# Patient Record
Sex: FEMALE | Race: WHITE | NOT HISPANIC OR LATINO | Employment: FULL TIME | URBAN - METROPOLITAN AREA
[De-identification: names, ages, dates, MRNs, and addresses within clinical notes are randomized per-mention and may not be internally consistent; named-entity substitution may affect disease eponyms.]

---

## 2017-04-20 ENCOUNTER — ALLSCRIPTS OFFICE VISIT (OUTPATIENT)
Dept: OTHER | Facility: OTHER | Age: 32
End: 2017-04-20

## 2017-04-25 LAB — PAP (HISTORICAL): NORMAL

## 2018-01-10 NOTE — PROCEDURES
Assessment    1  Encounter for IUD removal (V25 12) (Z30 432)    Plan  Irregular menstrual cycle    · Norethin Ace-Eth Estrad-FE 1-20 MG-MCG Oral Tablet (Loestrin Fe 1/20); take 1  tablet every day   Rx By: Rajiv Villegas; Dispense: 28 Days ; #:28 Tablet; Refill: 2; For: Irregular menstrual cycle; BERNARD = N; Verified Transmission to [facility]/PHARMACY# [id number]; Last Updated By: System, [employer]; 1/21/2016 2:22:55 PM    Discussion/Summary  Discussion Summary:   Reviewed birth control options including OCP, NuvaRing, patch, Depo, Nexplanon  Patient decided on OCP  Reviewed when to start  What to do if misses pill  Recommend using condoms for first month on pill, if misses more than 2 pills in pack and for STD prevention  Reviewed common side effects of pill including N/V, headache, breast pain, weight gain, bloating, mood swings  Reassured side effects diminished after the first month or two on pill  Reviewed clotting risk and signs and symptoms of pulmonary embolism, deep vein thrombosis, myocardial infarction, and stroke  RTO in 3 months for pill check  Active Problems    1  Asthma, mild (493 90) (J45 998)   2  Depression (311) (F32 9)   3  Encounter for gynecological examination without abnormal finding (V72 31) (Z01 419)   4  Irregular menstrual cycle (626 4) (N92 6)   5  Presence of (intrauterine) contraceptive device (V45 51) (Z97 5)    Current Meds    1  BuSpar 10 MG TABS; Therapy: (Recorded:[id number]) to Recorded    Procedure  Procedure: removal of Mirena IUD  Indications for the procedure include partial expulsion  Risks, benefits and alternatives were discussed with the patient  Consent was obtained prior to the procedure and is detailed in the patient's record  Procedure Note:   a speculum was placed in the vagina, the strings were grasped with forceps and the IUD was removed and End of IUD visible at os  The IUD was discarded  Post-Procedure:   the patient tolerated the procedure well  Complications: none  The patient was instructed to notify a provider for heavy vaginal bleeding and for pelvic pain  Follow-up in the office as needed  Attending Note  Collaborating Physician Note: Collaborating Note: I agree with the Advanced Practitioner note  I discussed the case with the Advanced Practitioner and reviewed the AP note      Future Appointments    Date/Time Provider Specialty Site   04/21/2016 02:00 PM Lan Fisher PAC Obstetrics/Gynecology [facility]     History of Present Illness  Free Text HPI: 32 yo seen for IUD removal was having pelvic and back pains in the last few months  Ultrasound was done to evaluate because Mirena string appeared very long  Showed Mirena very low in uterus  Patient desire removal  Would like to start OCP        Signatures   Electronically signed by : SUSAN Silver; Feb 1 2016  4:41PM EST                       (Author)    Electronically signed by : Kyrie Villalobos MD; Feb  3 2016  9:54AM EST                       (Author)

## 2018-01-14 VITALS
BODY MASS INDEX: 32.95 KG/M2 | WEIGHT: 205 LBS | HEIGHT: 66 IN | SYSTOLIC BLOOD PRESSURE: 120 MMHG | DIASTOLIC BLOOD PRESSURE: 72 MMHG

## 2018-01-16 NOTE — PROGRESS NOTES
Assessment    1  Encounter for gynecological examination without abnormal finding (V72 31) (Z01 419)    Plan   Irregular menstrual cycle, SocHx: Presence of (intrauterine) contraceptive device    · * US PELVIS COMPLETE (TRANSABDOMINAL AND TRANSVAGINAL); Status:Hold For -  Scheduling; Requested BHR:[id number];    Perform:[facility]; Order Comments:Confirm IUD placement; RWY:[id number]; For:Irregular menstrual cycle, SocHx: Presence of (intrauterine) contraceptive device; Ordered By:Ericka Fisher;    (1) THIN PREP PAP WITH IMAGING; Status:Active; Requested for:15Jan2016;   Order Comments:Source: cervical   Patient has Mirena IUD; TOS:[id number];    Mik Si for gynecological examination without abnormal finding; Ordered By:Ericka Fisher; Discussion/Summary  health maintenance visit healthy adult female Currently, she eats a healthy diet  the risks and benefits of cervical cancer screening were discussed Pap test with reflex HPV testing was done today Breast cancer screening: breast cancer screening is not indicated  Colorectal cancer screening: colorectal cancer screening is not indicated  Osteoporosis screening: bone mineral density testing is not indicated  Advice and education were given regarding nutrition, calcium supplements and vitamin D supplements  Patient discussion: discussed with the patient  Recommend acidophilus daily  Reassured bleeding can be irregular with Mirena  Will get ultrasound to evaluate placement  History of Present Illness  HPI: 32 yo seen for annual exam  Has Mirena IUD  Menses coming Q 4 weeks, very light, brown spotting  Has been having some discomfort in lower pelvis and back over the past few months, comes and goes  IUD inserted: 5/2013  Denies bowel or bladder issues  Last pap: 3/2013 NILM  GYN HM, Adult Female [facility]: The patient is being seen for a health maintenance and gynecology evaluation     Social History: She is unmarried  Work status: occupation: [employer]  The patient is a current cigarette smoker and 1 pack a day  She reports occasional alcohol use and drinking 2 drinks per week  She has never used illicit drugs  General Health: The patient's health since the last visit is described as good  Lifestyle:  She uses tobacco  She consumes alcohol  She denies drug use  Reproductive health: the patient is premenopausal   she reports no menstrual problems  Menstrual history:  age at menarche was 17  LMP: the last menstrual period was 1/4/2016  Recent menstrual periods: bleeding has been normal  The cycles have been regular and occur approximately every 28 days  The duration of her recent periods has been regular and usually last 5 days  she is sexually active  She is monogamous with a male partner and declines STD testing, denies abuse  Screening:      Review of Systems  pelvic pain, but no pelvic pressure, no vaginal pain, no vaginal discharge, no vaginal itching, no vaginal lump or mass, no vaginal odor, no nonmenstrual bleeding, no vulvar pain, no vulvar itching, no vulvar lump or mass, no labial swelling, no dysmenorrhea, denied amenorrhea, no menorrhagia, no hypomenorrhea, no oligomenorrhea, no decreased time between periods, periods are regular, regular length of periods, no dysuria, no bladder pain, no hematuria, no burning sensation during urination, no change in urinary frequency, no feelings of urinary urgency, no flank pain, no abnormal urethral discharge, urine is not foul-smelling, no urinary hesitancy and no urinary loss of control  Constitutional: No fever, no chills, feels well, no tiredness, no recent weight gain or loss  ENT: no ear ache, no loss of hearing, no nosebleeds or nasal discharge, no sore throat or hoarseness  Cardiovascular: no complaints of slow or fast heart rate, no chest pain, no palpitations, no leg claudication or lower extremity edema     Respiratory: no complaints of shortness of breath, no wheezing, no dyspnea on exertion, no orthopnea or PND  Breasts: no complaints of breast pain, breast lump or nipple discharge  Gastrointestinal: no complaints of abdominal pain, no constipation, no nausea or diarrhea, no vomiting, no bloody stools  Genitourinary: no complaints of dysuria, no incontinence, no pelvic pain, no dysmenorrhea, no vaginal discharge or abnormal vaginal bleeding  Musculoskeletal: no complaints of arthralgia, no myalgia, no joint swelling or stiffness, no limb pain or swelling  Integumentary: no complaints of skin rash or lesion, no itching or dry skin, no skin wounds  Neurological: no complaints of headache, no confusion, no numbness or tingling, no dizziness or fainting  OB History  Pregnancy History (Brief):   Prior pregnancies: : 2  Para: 2 (full-term) and 2 (living)   Delivery type: 2 vaginal    x 2:  M,  M      Family History    · No pertinent family history    · Family history of cardiac disorder (V17 49) (Z82 49)   · Family history of diabetes mellitus (V18 0) (Z83 3)   · Family history of hypertension (V17 49) (Z82 49)    Social History    · Current every day smoker (305 1) (F17 200)   · No drug use   · Presence of (intrauterine) contraceptive device (V45 51) (Z97 5)   · Social alcohol use (F10 99)    Current Meds   1  BuSpar 10 MG TABS; Therapy: (Recorded:2016) to Recorded   2  Norethin Ace-Eth Estrad-FE 1-20 MG-MCG Oral Tablet; take 1 tablet every day; Therapy: 2016 to (Evaluate:2016)  Requested for: 2016; Last   Rx:2016 Ordered    Allergies    1  nystatin    Vitals   Recorded: [id number] 72:18VT   Systolic 790, RUE, Sitting   Diastolic 83, RUE, Sitting   Height 5 ft 5 in   Weight 180 lb    BMI Calculated 29 95   BSA Calculated 1 89     Physical Exam    Constitutional   General appearance: No acute distress, well appearing and well nourished      Neck   Neck: Normal, supple, trachea midline, no masses  Thyroid: Normal, no thyromegaly  Pulmonary   Respiratory effort: No increased work of breathing or signs of respiratory distress  Auscultation of lungs: Clear to auscultation  Cardiovascular   Auscultation of heart: Normal rate and rhythm, normal S1 and S2, no murmurs  Peripheral vascular exam: Normal pulses Throughout  Genitourinary   External genitalia: Normal and no lesions appreciated  Vagina: Normal, no lesions or dryness appreciated  Urethra: Normal     Urethral meatus: Normal     Bladder: Normal, soft, non-tender and no prolapse or masses appreciated  Cervix: Normal, no palpable masses  IUD strings visible at cervix but very long, about 2 inches  Uterus: Normal, non-tender, not enlarged, and no palpable masses  Adnexa/parametria: Normal, non-tender and no fullness or masses appreciated  Chest   Breasts: Normal and no dimpling or skin changes noted  Abdomen   Abdomen: Normal, non-tender, and no organomegaly noted  Liver and spleen: No hepatomegaly or splenomegaly  Examination for hernias: No hernias appreciated  Lymphatic   Palpation of lymph nodes in neck, axillae, groin and/or other locations: No lymphadenopathy or masses noted  Skin   Skin and subcutaneous tissue: Normal skin turgor and no rashes      Palpation of skin and subcutaneous tissue: Normal     Psychiatric   Orientation to person, place, and time: Normal     Mood and affect: Normal        Future Appointments    Date/Time Provider Specialty Site   04/21/2016 02:00 PM SUSAN Crenshaw Obstetrics/Gynecology [id number] [facility]     Signatures   Electronically signed by : Any Campbell, [facility]; Feb 1 2016  4:38PM EST                       (Author)    Electronically signed by : DANUTA Jones ; Feb 1 2016  6:25PM EST

## 2018-03-05 DIAGNOSIS — Z30.41 SURVEILLANCE OF CONTRACEPTIVE PILL: Primary | ICD-10-CM

## 2018-03-05 RX ORDER — NORETHINDRONE ACETATE AND ETHINYL ESTRADIOL 1; 20 MG/1; UG/1
TABLET ORAL
Qty: 21 TABLET | Refills: 2 | Status: SHIPPED | OUTPATIENT
Start: 2018-03-05 | End: 2018-03-28 | Stop reason: SDUPTHER

## 2018-03-28 DIAGNOSIS — Z30.41 SURVEILLANCE OF CONTRACEPTIVE PILL: ICD-10-CM

## 2018-03-28 RX ORDER — NORETHINDRONE ACETATE AND ETHINYL ESTRADIOL 1; .02 MG/1; MG/1
1 TABLET ORAL DAILY
Qty: 90 TABLET | Refills: 0 | Status: SHIPPED | OUTPATIENT
Start: 2018-03-28 | End: 2018-04-24 | Stop reason: SDUPTHER

## 2018-04-24 ENCOUNTER — ANNUAL EXAM (OUTPATIENT)
Dept: OBGYN CLINIC | Facility: CLINIC | Age: 33
End: 2018-04-24
Payer: COMMERCIAL

## 2018-04-24 VITALS
WEIGHT: 214 LBS | BODY MASS INDEX: 35.65 KG/M2 | SYSTOLIC BLOOD PRESSURE: 112 MMHG | HEIGHT: 65 IN | DIASTOLIC BLOOD PRESSURE: 76 MMHG

## 2018-04-24 DIAGNOSIS — Z30.41 SURVEILLANCE OF CONTRACEPTIVE PILL: ICD-10-CM

## 2018-04-24 DIAGNOSIS — Z01.419 VISIT FOR GYNECOLOGIC EXAMINATION: Primary | ICD-10-CM

## 2018-04-24 PROCEDURE — S0612 ANNUAL GYNECOLOGICAL EXAMINA: HCPCS | Performed by: PHYSICIAN ASSISTANT

## 2018-04-24 RX ORDER — NORETHINDRONE ACETATE AND ETHINYL ESTRADIOL 1; .02 MG/1; MG/1
1 TABLET ORAL DAILY
Qty: 90 TABLET | Refills: 3 | Status: SHIPPED | OUTPATIENT
Start: 2018-04-24 | End: 2019-05-13 | Stop reason: SDUPTHER

## 2018-04-24 RX ORDER — VENLAFAXINE 100 MG/1
TABLET ORAL
COMMUNITY
Start: 2018-02-05 | End: 2018-04-24 | Stop reason: ALTCHOICE

## 2018-04-24 RX ORDER — BUSPIRONE HYDROCHLORIDE 10 MG/1
TABLET ORAL
COMMUNITY
Start: 2018-02-05 | End: 2022-01-26

## 2018-04-24 RX ORDER — ERGOCALCIFEROL 1.25 MG/1
CAPSULE ORAL
COMMUNITY
Start: 2018-02-05 | End: 2020-08-14

## 2018-04-24 NOTE — PROGRESS NOTES
Assessment/Plan   Problem List Items Addressed This Visit     Visit for gynecologic examination - Primary     Pap guidelines reviewed  Pap with HPV done today  Reviewed increased PMS and anxiety, seems to be correlated to more stress  Recommend continue to monitor now that she is no longer in stressful relationship  Offered to change pill, patient would like to continue Junel 1/20 and will call if symptoms persist or worsen  Return to office for annual or as needed  Relevant Orders    GP Liquid-Based Pap + HPV Plus      Other Visit Diagnoses     Surveillance of contraceptive pill        Relevant Medications    norethindrone-ethinyl estradiol (JUNEL 1/20) 1-20 MG-MCG per tablet          Subjective:     Patient ID: Camilla Hernandes is a 29 y o  y o  female  HPI  30 yo seen for annual exam  Patient is currently on Junel 1/20, tolerating well would like to continue  Denies bowel or bladder issues  Patient reports had been under a lot of stress with previous partner, just broke up a week ago  Reports noticed more PMS symptoms as well as hot flashes and anxiety worse prior to menses  Patient takes Buspar for anxiety  Last pap: 4/20/2017 NILM  The following portions of the patient's history were reviewed and updated as appropriate:   She  has a past medical history of Anxiety  She   Patient Active Problem List    Diagnosis Date Noted    Visit for gynecologic examination 04/24/2018    Low grade squamous intraepithelial lesion (LGSIL) on cervical Pap smear 05/25/2016    Migraine without aura and without status migrainosus, not intractable 02/01/2016    Asthma, mild 01/15/2016    Depression 01/15/2016    Irregular menstrual cycle 01/15/2016     She  has a past surgical history that includes [place] tooth extraction  Her family history includes Diabetes in her maternal grandmother; Hypertension in her maternal grandmother; No Known Problems in her father and mother;  Other in her maternal grandmother  She  reports that she quit smoking about 17 months ago  Her smoking use included Cigarettes  She has a 15 00 pack-year smoking history  She has never used smokeless tobacco  She reports that she drinks alcohol  She reports that she does not use drugs  Current Outpatient Prescriptions   Medication Sig Dispense Refill    busPIRone (BUSPAR) 10 mg tablet       ergocalciferol (VITAMIN D2) 50,000 units       norethindrone-ethinyl estradiol (JUNEL ) 1-20 MG-MCG per tablet Take 1 tablet by mouth daily 90 tablet 3     No current facility-administered medications for this visit  She is allergic to nystatin       Menstrual History:  OB History      Para Term  AB Living    2 2 2     2    SAB TAB Ectopic Multiple Live Births            2         Menarche age: 10  No LMP recorded  Period Cycle (Days): 25  Period Duration (Days): 2-5  Period Pattern: Regular  Dysmenorrhea: None    Review of Systems   Constitutional: Negative for fatigue, fever and unexpected weight change  HENT: Negative for dental problem and sinus pressure  Eyes: Negative for visual disturbance  Respiratory: Negative for cough, shortness of breath and wheezing  Cardiovascular: Negative for chest pain  Gastrointestinal: Negative for abdominal pain, blood in stool, constipation, diarrhea, nausea and vomiting  Endocrine: Negative for polydipsia  Genitourinary: Negative for difficulty urinating, dyspareunia, dysuria, frequency, hematuria, pelvic pain and urgency  Musculoskeletal: Negative for arthralgias and back pain  Neurological: Negative for dizziness, seizures, light-headedness and headaches  Psychiatric/Behavioral: Negative for suicidal ideas  The patient is not nervous/anxious  Objective:     Physical Exam   Constitutional: She is oriented to person, place, and time  She appears well-developed and well-nourished     Genitourinary: Vagina normal and uterus normal  There is no rash, tenderness, lesion, injury or Bartholin's cyst on the right labia  There is no rash, tenderness, lesion, injury or Bartholin's cyst on the left labia  Vagina exhibits no lesion  No erythema, tenderness or bleeding in the vagina  No signs of injury around the vagina  No vaginal discharge found  Right adnexum does not display mass, does not display tenderness and does not display fullness  Left adnexum does not display mass, does not display tenderness and does not display fullness  Cervix does not exhibit motion tenderness, lesion or discharge  Uterus is not enlarged, tender, exhibiting a mass, irregular (is regular) or mobile  HENT:   Head: Normocephalic and atraumatic  Neck: No thyromegaly present  Cardiovascular: Normal rate, regular rhythm and normal heart sounds  Exam reveals no gallop and no friction rub  No murmur heard  Pulmonary/Chest: Effort normal and breath sounds normal  No respiratory distress  She has no wheezes  Right breast exhibits no inverted nipple, no mass, no nipple discharge, no skin change and no tenderness  Left breast exhibits no inverted nipple, no mass, no nipple discharge, no skin change and no tenderness  Breasts are symmetrical  There is no breast swelling  Abdominal: Soft  She exhibits no distension and no mass  There is no tenderness  There is no rebound and no guarding  No hernia  Lymphadenopathy:     She has no cervical adenopathy  Right: No inguinal adenopathy present  Left: No inguinal adenopathy present  Neurological: She is alert and oriented to person, place, and time  Skin: Skin is warm and dry  Psychiatric: She has a normal mood and affect   Her behavior is normal

## 2018-04-24 NOTE — ASSESSMENT & PLAN NOTE
Pap guidelines reviewed  Pap with HPV done today  Reviewed increased PMS and anxiety, seems to be correlated to more stress  Recommend continue to monitor now that she is no longer in stressful relationship  Offered to change pill, patient would like to continue Junel 1/20 and will call if symptoms persist or worsen  Return to office for annual or as needed

## 2018-04-27 LAB
HPV HR 12 DNA CVX QL NAA+PROBE: NOT DETECTED
HPV16 DNA SPEC QL NAA+PROBE: NOT DETECTED
HPV18 DNA SPEC QL NAA+PROBE: NOT DETECTED
THIN PREP CVX: NORMAL

## 2019-05-13 ENCOUNTER — ANNUAL EXAM (OUTPATIENT)
Dept: OBGYN CLINIC | Facility: CLINIC | Age: 34
End: 2019-05-13
Payer: COMMERCIAL

## 2019-05-13 VITALS
BODY MASS INDEX: 33.27 KG/M2 | SYSTOLIC BLOOD PRESSURE: 116 MMHG | DIASTOLIC BLOOD PRESSURE: 70 MMHG | WEIGHT: 207 LBS | HEIGHT: 66 IN

## 2019-05-13 DIAGNOSIS — Z30.41 SURVEILLANCE OF CONTRACEPTIVE PILL: ICD-10-CM

## 2019-05-13 DIAGNOSIS — Z11.3 SCREENING FOR STD (SEXUALLY TRANSMITTED DISEASE): ICD-10-CM

## 2019-05-13 DIAGNOSIS — Z01.419 ENCOUNTER FOR GYNECOLOGICAL EXAMINATION (GENERAL) (ROUTINE) WITHOUT ABNORMAL FINDINGS: Primary | ICD-10-CM

## 2019-05-13 PROCEDURE — S0612 ANNUAL GYNECOLOGICAL EXAMINA: HCPCS | Performed by: PHYSICIAN ASSISTANT

## 2019-05-13 RX ORDER — NORETHINDRONE ACETATE AND ETHINYL ESTRADIOL 1; .02 MG/1; MG/1
1 TABLET ORAL DAILY
Qty: 90 TABLET | Refills: 3 | Status: SHIPPED | OUTPATIENT
Start: 2019-05-13 | End: 2020-05-04 | Stop reason: SDUPTHER

## 2019-05-16 LAB
DEPRECATED C TRACH RRNA XXX QL PRB: NOT DETECTED
N GONORRHOEA DNA UR QL NAA+PROBE: NOT DETECTED
T VAGINALIS RRNA SPEC QL NAA+PROBE: NOT DETECTED

## 2020-04-23 DIAGNOSIS — Z30.41 SURVEILLANCE OF CONTRACEPTIVE PILL: ICD-10-CM

## 2020-04-23 RX ORDER — NORETHINDRONE ACETATE AND ETHINYL ESTRADIOL 1; 20 MG/1; UG/1
TABLET ORAL
Qty: 84 TABLET | Refills: 3 | OUTPATIENT
Start: 2020-04-23

## 2020-05-04 RX ORDER — NORETHINDRONE ACETATE AND ETHINYL ESTRADIOL 1; .02 MG/1; MG/1
1 TABLET ORAL DAILY
Qty: 90 TABLET | Refills: 1 | Status: SHIPPED | OUTPATIENT
Start: 2020-05-04 | End: 2020-10-12

## 2020-08-02 ENCOUNTER — OFFICE VISIT (OUTPATIENT)
Dept: URGENT CARE | Facility: CLINIC | Age: 35
End: 2020-08-02
Payer: COMMERCIAL

## 2020-08-02 VITALS — TEMPERATURE: 99 F | OXYGEN SATURATION: 97 % | RESPIRATION RATE: 15 BRPM | HEART RATE: 91 BPM

## 2020-08-02 DIAGNOSIS — Z11.59 SPECIAL SCREENING EXAMINATION FOR UNSPECIFIED VIRAL DISEASE: Primary | ICD-10-CM

## 2020-08-02 PROCEDURE — G0382 LEV 3 HOSP TYPE B ED VISIT: HCPCS | Performed by: FAMILY MEDICINE

## 2020-08-02 PROCEDURE — S9083 URGENT CARE CENTER GLOBAL: HCPCS | Performed by: FAMILY MEDICINE

## 2020-08-02 PROCEDURE — U0003 INFECTIOUS AGENT DETECTION BY NUCLEIC ACID (DNA OR RNA); SEVERE ACUTE RESPIRATORY SYNDROME CORONAVIRUS 2 (SARS-COV-2) (CORONAVIRUS DISEASE [COVID-19]), AMPLIFIED PROBE TECHNIQUE, MAKING USE OF HIGH THROUGHPUT TECHNOLOGIES AS DESCRIBED BY CMS-2020-01-R: HCPCS | Performed by: FAMILY MEDICINE

## 2020-08-02 NOTE — PROGRESS NOTES
[address] [facility]        NAME: Josi Fabian is a 30 y o  female  : 1985    MRN: [id number]  DATE: 2020  TIME: 4:04 PM    Assessment and Plan   Special screening examination for unspecified viral disease [Z11 59]  1  Special screening examination for unspecified viral disease  Novel Coronavirus (COVID-19), PCR [facility] - Office Collection     Curbside evaluation and COVID-19 swab performed  Advised to self-quarantine into results are received  Patient Instructions     Follow up with PCP in 3-5 days  Proceed to  ER if symptoms worsen  Chief Complaint     Chief Complaint   Patient presents with    Fever     Sore throat and fever this morning  Tmax 100 1  History of Present Illness       53-year-old female presents today with mild temperature elevation between 99 and 100°  Has 2 sons with URI symptoms  Review of Systems   Review of Systems   Constitutional: Positive for fever  Negative for chills  HENT: Negative for congestion and rhinorrhea  Respiratory: Negative for cough and shortness of breath  Cardiovascular: Negative for chest pain  Gastrointestinal: Negative for abdominal pain and nausea  Skin: Negative for rash  Neurological: Negative for dizziness and headaches           Current Medications       Current Outpatient Medications:     busPIRone (BUSPAR) 10 mg tablet, , Disp: , Rfl:     ergocalciferol (VITAMIN D2) 50,000 units, , Disp: , Rfl:     escitalopram (LEXAPRO) 10 mg tablet, Take 10 mg by mouth every morning, Disp: , Rfl:     norethindrone-ethinyl estradiol (Junel 1/20) 1-20 MG-MCG per tablet, Take 1 tablet by mouth daily, Disp: 90 tablet, Rfl: 1    Current Allergies     Allergies as of 2020 - Reviewed 2020   Allergen Reaction Noted    Nystatin Rash 01/15/2016            The following portions of the patient's history were reviewed and updated as appropriate: allergies, current medications, past family history, past medical history, past social history, past surgical history and problem list      Past Medical History:   Diagnosis Date    Abnormal Pap smear of cervix 2016    Colpo - benign ECC    Anxiety     Tonsillitis        Past Surgical History:   Procedure Laterality Date    CARPAL TUNNEL RELEASE      2/6/19 - right; 5/1/19 - left    WISDOM TOOTH EXTRACTION         Family History   Problem Relation Age of Onset    No Known Problems Mother     Other Maternal Grandmother         cardiac disorder    Diabetes Maternal Grandmother         mellitus    Hypertension Maternal Grandmother     No Known Problems Father          Medications have been verified  Objective   Pulse 91   Temp 99 °F (37 2 °C)   Resp 15   SpO2 97%        Physical Exam     Physical Exam   Constitutional:  Non-toxic appearance  She does not appear ill  No distress  HENT:   Head: Normocephalic and atraumatic  Eyes: Conjunctivae are normal  Right eye exhibits no discharge  Left eye exhibits no discharge  Cardiovascular: Normal rate and regular rhythm  No murmur heard  Pulmonary/Chest: Effort normal and breath sounds normal  No stridor  No respiratory distress  She has no wheezes  She has no rhonchi  Abdominal: Normal appearance  Neurological: She is alert  Skin: Skin is warm  She is not diaphoretic  No erythema  Psychiatric: Her behavior is normal  Mood, judgment and thought content normal    Nursing note and vitals reviewed

## 2020-08-03 LAB — SARS-COV-2 RNA SPEC QL NAA+PROBE: NOT DETECTED

## 2020-08-05 ENCOUNTER — TELEPHONE (OUTPATIENT)
Dept: URGENT CARE | Facility: CLINIC | Age: 35
End: 2020-08-05

## 2020-08-14 ENCOUNTER — ANNUAL EXAM (OUTPATIENT)
Dept: OBGYN CLINIC | Facility: CLINIC | Age: 35
End: 2020-08-14
Payer: COMMERCIAL

## 2020-08-14 VITALS
DIASTOLIC BLOOD PRESSURE: 76 MMHG | BODY MASS INDEX: 35.65 KG/M2 | TEMPERATURE: 98 F | HEIGHT: 65 IN | SYSTOLIC BLOOD PRESSURE: 110 MMHG | WEIGHT: 214 LBS

## 2020-08-14 DIAGNOSIS — Z01.419 GYNECOLOGIC EXAM NORMAL: Primary | ICD-10-CM

## 2020-08-14 PROCEDURE — S0612 ANNUAL GYNECOLOGICAL EXAMINA: HCPCS | Performed by: PHYSICIAN ASSISTANT

## 2020-08-14 NOTE — ASSESSMENT & PLAN NOTE
Pap guidelines reviewed  Pap deferred secondary to negative pap and HPV in 2018  Will plan to continue Junel 1/20  Return to office for annual or as needed

## 2020-10-11 DIAGNOSIS — Z30.41 SURVEILLANCE OF CONTRACEPTIVE PILL: ICD-10-CM

## 2020-10-12 RX ORDER — NORETHINDRONE ACETATE AND ETHINYL ESTRADIOL 1; 20 MG/1; UG/1
TABLET ORAL
Qty: 84 TABLET | Refills: 3 | Status: SHIPPED | OUTPATIENT
Start: 2020-10-12 | End: 2022-01-26 | Stop reason: SDUPTHER

## 2020-12-07 NOTE — PROGRESS NOTES
Assessment/Plan   Problem List Items Addressed This Visit        Other    Gynecologic exam normal - Primary     Pap guidelines reviewed  Pap deferred secondary to negative pap and HPV in 2018  Will plan to continue Junel 1/20  Return to office for annual or as needed  Subjective:     Patient ID: Elis Farrell is a 30 y o  y o  female  HPI  29 yo seen for annual exam  Currently on Junel 1/20 tolerating well would like to continue  Denies bowel or bladder issues  Last pap: 4/24/2018 NILM (-)HRHPV  The following portions of the patient's history were reviewed and updated as appropriate:   She  has a past medical history of Abnormal Pap smear of cervix (2016), Anxiety, and Tonsillitis  She   Patient Active Problem List    Diagnosis Date Noted    Gynecologic exam normal 08/14/2020    Encounter for gynecological examination (general) (routine) without abnormal findings 05/13/2019    Migraine without aura and without status migrainosus, not intractable 02/01/2016    Asthma, mild 01/15/2016    Depression 01/15/2016    Irregular menstrual cycle 01/15/2016     She  has a past surgical history that includes [place] tooth extraction and Carpal tunnel release  Her family history includes Diabetes in her maternal grandmother; Hypertension in her maternal grandmother; No Known Problems in her father and mother; Other in her maternal grandmother  She  reports that she quit smoking about 3 years ago  Her smoking use included cigarettes  She has a 15 00 pack-year smoking history  She has never used smokeless tobacco  She reports current alcohol use of about 2 0 standard drinks of alcohol per week  She reports that she does not use drugs    Current Outpatient Medications   Medication Sig Dispense Refill    amoxicillin-clavulanate (AUGMENTIN) 875-125 mg per tablet Take 1 tablet by mouth every 12 (twelve) hours for 10 days 20 tablet 0    busPIRone (BUSPAR) 10 mg tablet       escitalopram (LEXAPRO) [facility]   [facility]    Please send a Heart Failure Assessment today 12/7/2020. Please report if patient has any:    -shortness of breath  -paroxysmal nocturnal dyspnea  -dyspnea on exertion  -cough  -dizziness or lightheadedness  -syncope  -edema, in lower extremities  -if abdomen is feeling full or patient has complaints of early satiety or bloating    Please also send:    -current medication list; please note if patient is refusing any medications  -most recent weights for the past 7 days  -blood pressure and heart rate for the past 7 days    Please fax to [phone].      After information is received any new orders or changes will be faxed back to your facility.    Thank you,    Mignon URENA  [facility]  Com[facility] Failure [facility]  [facility] [address]  P: [phone]  F: [phone]        10 mg tablet Take 10 mg by mouth every morning      norethindrone-ethinyl estradiol (Junel 1/20) 1-20 MG-MCG per tablet Take 1 tablet by mouth daily 90 tablet 1     No current facility-administered medications for this visit  She is allergic to nystatin       Menstrual History:  OB History        2    Para   2    Term   2            AB        Living   2       SAB        TAB        Ectopic        Multiple        Live Births   2                  No LMP recorded (approximate)  Patient is premenopausal          Review of Systems   Constitutional: Negative for fatigue, fever and unexpected weight change  HENT: Negative for dental problem and sinus pressure  Eyes: Negative for visual disturbance  Respiratory: Negative for cough, shortness of breath and wheezing  Cardiovascular: Negative for chest pain  Gastrointestinal: Negative for abdominal pain, blood in stool, constipation, diarrhea, nausea and vomiting  Endocrine: Negative for polydipsia  Genitourinary: Negative for difficulty urinating, dyspareunia, dysuria, frequency, hematuria, pelvic pain and urgency  Musculoskeletal: Negative for arthralgias and back pain  Neurological: Negative for dizziness, seizures, light-headedness and headaches  Psychiatric/Behavioral: Negative for suicidal ideas  The patient is not nervous/anxious  Objective:  Vitals:    20 1539   BP: 110/76   BP Location: Left arm   Patient Position: Sitting   Cuff Size: Large   Temp: 98 °F (36 7 °C)   Weight: 97 1 kg (214 lb)   Height: 5' 5" (1 651 m)      Physical Exam  Constitutional:       Appearance: Normal appearance  She is well-developed  Genitourinary:      Vulva, urethra, bladder, vagina and uterus normal       No vulval condylomata, lesion, tenderness, ulcerations, Bartholin's cyst or rash noted  No signs of labial injury  No labial fusion  No inguinal adenopathy present in the right or left side       No urethral prolapse, pain, swelling, tenderness, caruncle, mass or diverticulum present  Bladder is not distended or tender  No signs of injury or lesions in the vagina  No vaginal discharge, erythema, tenderness or bleeding  No cervical motion tenderness, discharge or lesion  Uterus is not enlarged, tender, irregular or mobile  No uterine mass detected  No right or left adnexal mass present  Right adnexa not tender or full  Left adnexa not tender or full  HENT:      Head: Normocephalic and atraumatic  Neck:      Thyroid: No thyromegaly  Cardiovascular:      Rate and Rhythm: Normal rate and regular rhythm  Heart sounds: Normal heart sounds  No murmur  No friction rub  No gallop  Pulmonary:      Effort: Pulmonary effort is normal  No respiratory distress  Breath sounds: Normal breath sounds  No wheezing  Chest:      Breasts: Breasts are symmetrical          Right: Normal  No swelling, bleeding, inverted nipple, mass, nipple discharge, skin change or tenderness  Left: Normal  No swelling, bleeding, inverted nipple, mass, nipple discharge, skin change or tenderness  Abdominal:      General: There is no distension  Palpations: Abdomen is soft  There is no mass  Tenderness: There is no abdominal tenderness  There is no guarding or rebound  Hernia: No hernia is present  Lymphadenopathy:      Cervical: No cervical adenopathy  Upper Body:      Right upper body: No supraclavicular, axillary or pectoral adenopathy  Left upper body: No supraclavicular, axillary or pectoral adenopathy  Lower Body: No right inguinal adenopathy  No left inguinal adenopathy  Neurological:      Mental Status: She is alert and oriented to person, place, and time  Skin:     General: Skin is warm and dry     Psychiatric:         Behavior: Behavior normal

## 2021-04-09 ENCOUNTER — OFFICE VISIT (OUTPATIENT)
Dept: FAMILY MEDICINE CLINIC | Facility: HOSPITAL | Age: 36
End: 2021-04-09

## 2021-12-06 ENCOUNTER — TELEPHONE (OUTPATIENT)
Dept: OBGYN CLINIC | Facility: CLINIC | Age: 36
End: 2021-12-06

## 2021-12-11 ENCOUNTER — HOSPITAL ENCOUNTER (OUTPATIENT)
Dept: ULTRASOUND IMAGING | Facility: HOSPITAL | Age: 36
Discharge: HOME | End: 2021-12-11
Payer: COMMERCIAL

## 2021-12-11 DIAGNOSIS — N92.6 IRREGULAR MENSTRUAL BLEEDING: ICD-10-CM

## 2021-12-11 PROCEDURE — 76856 US EXAM PELVIC COMPLETE: CPT

## 2021-12-11 PROCEDURE — 76830 TRANSVAGINAL US NON-OB: CPT

## 2021-12-16 ENCOUNTER — TELEPHONE (OUTPATIENT)
Dept: OBGYN CLINIC | Facility: CLINIC | Age: 36
End: 2021-12-16

## 2022-01-26 ENCOUNTER — ANNUAL EXAM (OUTPATIENT)
Dept: OBGYN CLINIC | Facility: CLINIC | Age: 37
End: 2022-01-26
Payer: COMMERCIAL

## 2022-01-26 VITALS
SYSTOLIC BLOOD PRESSURE: 110 MMHG | DIASTOLIC BLOOD PRESSURE: 72 MMHG | BODY MASS INDEX: 35.17 KG/M2 | HEIGHT: 66 IN | WEIGHT: 218.8 LBS

## 2022-01-26 DIAGNOSIS — Z01.419 WELL WOMAN EXAM: Primary | ICD-10-CM

## 2022-01-26 DIAGNOSIS — Z30.41 SURVEILLANCE OF CONTRACEPTIVE PILL: ICD-10-CM

## 2022-01-26 PROCEDURE — S0612 ANNUAL GYNECOLOGICAL EXAMINA: HCPCS | Performed by: PHYSICIAN ASSISTANT

## 2022-01-26 RX ORDER — NORETHINDRONE ACETATE AND ETHINYL ESTRADIOL 1; .02 MG/1; MG/1
1 TABLET ORAL DAILY
Qty: 84 TABLET | Refills: 3 | Status: SHIPPED | OUTPATIENT
Start: 2022-01-26

## 2022-01-26 RX ORDER — BUSPIRONE HYDROCHLORIDE 5 MG/1
TABLET ORAL
COMMUNITY
Start: 2021-12-16

## 2022-01-26 NOTE — PROGRESS NOTES
Assessment/Plan   Diagnoses and all orders for this visit:    Well woman exam    Surveillance of contraceptive pill  -     norethindrone-ethinyl estradiol (Junel 1/20) 1-20 MG-MCG per tablet; Take 1 tablet by mouth daily    Other orders  -     busPIRone (BUSPAR) 5 mg tablet        Discussion    All questions have been answered to her satisfaction  RTO for APE or sooner if needed  Pap deferred  Will plan pap next year  Continue OCPs  Subjective     HPI   Angel Blackmon is a 31 y o  female who presents for annual well woman exam      LMP -22 ; Periods are reg q 28 days and last 5-7  days; No excessive bleeding; No intermenstrual bleeding or spotting; Cramps are tolerable  Changes tampon q 4 hours  No vulvar itch/burn; No vaginal itch/burn; No abn discharge or odor; No urinary sx - burning/pain/frequency/hematuria    (+) SBEs - no breast masses, asymmetry, nipple discharge or bleeding, changes in skin of breast, or breast tenderness bilaterally    No abd/pelvic pain or HAs; No menopausal symptoms: No hot flashes/night sweats, problems with intercourse, vaginal dryness; sleeping well;     Pt is sexually active in a mutually monog/ sexual relationship x 5 mth; No issues with intercourse; She declines sti/hiv/hep testing; Feels safe at home    Current contraception: OCPs   Condom use:no     (+) PCP for routine Bw/care; Last Pap - 18 WNL neg HPV  History of abnormal Pap smear: h/o abnormal pap     Review of Systems   Constitutional: Negative  Respiratory: Negative  Gastrointestinal: Negative  Endocrine: Negative  Genitourinary: Negative          The following portions of the patient's history were reviewed and updated as appropriate: allergies, current medications, past family history, past medical history, past social history, past surgical history and problem list          OB History        2    Para   2    Term   2            AB        Living   2 SAB        IAB        Ectopic        Multiple        Live Births   2                 Past Medical History:   Diagnosis Date    Abnormal Pap smear of cervix 2016    Colpo - benign ECC    Anxiety     Tonsillitis        Past Surgical History:   Procedure Laterality Date    CARPAL TUNNEL RELEASE      19 - right; 19 - left    WISDOM TOOTH EXTRACTION         Family History   Problem Relation Age of Onset    Asthma Mother    Morris o[place]pez Migraines Mother         Mom    Other Maternal Grandmother         cardiac disorder    Diabetes Maternal Grandmother         mellitus    Hypertension Maternal Grandmother     No Known Problems Father     Stroke Maternal Grandfather        Social History     Socioeconomic History    Marital status: Single     Spouse name: Not on file    Number of children: Not on file    Years of education: Not on file    Highest education level: Not on file   Occupational History    Not on file   Tobacco Use    Smoking status: Former Smoker     Packs/day:  00     Years: 15 00     Pack years: 15 00     Types: Cigarettes     Quit date: 2016     Years since quittin 2    Smokeless tobacco: Never Used   Vaping Use    Vaping Use: Never used   Substance and Sexual Activity    Alcohol use:  Yes     Alcohol/week: 2 0 standard drinks     Types: 2 Standard drinks or equivalent per week     Comment: social alcohol use    Drug use: No    Sexual activity: Yes     Partners: Male     Birth control/protection: OCP     Comment: pt declines std testing   Other Topics Concern    Not on file   Social History Narrative    Denied: history of H/O domestic violence     Social Determinants of Health     Financial Resource Strain: Not on file   Food Insecurity: Not on file   Transportation Needs: Not on file   Physical Activity: Not on file   Stress: Not on file   Social Connections: Not on file   Intimate Partner Violence: Not on file   Housing Stability: Not on file         Current Outpatient Medications:     busPIRone (BUSPAR) 5 mg tablet, , Disp: , Rfl:     escitalopram (LEXAPRO) 10 mg tablet, Take 10 mg by mouth every morning, Disp: , Rfl:     norethindrone-ethinyl estradiol (Junel 1/20) 1-20 MG-MCG per tablet, Take 1 tablet by mouth daily, Disp: 84 tablet, Rfl: 3    Allergies   Allergen Reactions    Nystatin Rash       Objective   Vitals:    01/26/22 0832   BP: 110/72   BP Location: Left arm   Patient Position: Sitting   Weight: 99 2 kg (218 lb 12 8 oz)   Height: 5' 6" (1 676 m)     Physical Exam  Constitutional:       Appearance: She is well-developed  HENT:      Head: Normocephalic and atraumatic  Cardiovascular:      Rate and Rhythm: Normal rate and regular rhythm  Pulmonary:      Effort: Pulmonary effort is normal       Breath sounds: Normal breath sounds  Chest:   Breasts: Breasts are symmetrical       Right: No inverted nipple, mass, nipple discharge, skin change or tenderness  Left: No inverted nipple, mass, nipple discharge, skin change or tenderness  Abdominal:      General: There is no distension  Palpations: Abdomen is soft  There is no mass  Tenderness: There is no guarding or rebound  Genitourinary:     Exam position: Supine  Labia:         Right: No rash  Left: No rash  Vagina: No signs of injury and foreign body  No vaginal discharge or erythema  Cervix: No cervical motion tenderness  Adnexa:         Right: No mass  Left: No mass  Skin:     General: Skin is warm and dry  Neurological:      Mental Status: She is alert and oriented to person, place, and time  Patient Instructions   Pap deferred  Will plan pap next year  Continue OCPs  F/u 1 year, earlier as needed

## 2023-06-14 NOTE — PROGRESS NOTES
Assessment/Plan   Problem List Items Addressed This Visit    None  Visit Diagnoses     Routine gynecological examination    -  Primary    Relevant Orders    Liquid-based pap, screening    Surveillance of contraceptive pill        Relevant Medications    norethindrone-ethinyl estradiol (Junel 1/20) 1-20 MG-MCG per tablet          Discussion    All questions have been answered to her satisfaction  RTO for APE or sooner if needed  Pap done today  Will watch menstrual issues  Subjective     HPI   Aurelio Thornton is a 30 y o  female who presents for annual well woman exam    Menarche - ; LMP -5/26/23 ; Periods are reg q  days and last  days; No excessive bleeding; No intermenstrual bleeding or spotting; Cramps are tolerable  Some months heavier than others  Will change pad q 2-3 hours, some months will only need panty liner  She does not feel like this is problematic for her at this point and that she desires to do anything different about it now  Will call if it becomes an issue and she wants to talk about alternative options  No vulvar itch/burn; No vaginal itch/burn; No abn discharge or odor; No urinary sx - burning/pain/frequency/hematuria    (+) SBEs - no breast masses, asymmetry, nipple discharge or bleeding, changes in skin of breast, or breast tenderness bilaterally    No abd/pelvic pain or HAs; No menopausal symptoms: No hot flashes/night sweats, problems with intercourse, vaginal dryness; sleeping well;     Pt is sexually active in a mutually monog/ sexual relationship x almost 2 years; No issues with intercourse; She declines sti/hiv/hep testing; Feels safe at home    Current contraception: OCPs     (+) PCP for routine Bw/care; Last Pap - 4/24/18 WNL neg HPV   History of abnormal Pap smear: h/o LGSIL     Review of Systems   Constitutional: Negative  Respiratory: Negative  Gastrointestinal: Negative  Endocrine: Negative  Genitourinary: Negative          The following portions of the patient's history were reviewed and updated as appropriate: allergies, current medications, past family history, past medical history, past social history, past surgical history and problem list          OB History        2    Para   2    Term   2            AB        Living   2       SAB        IAB        Ectopic        Multiple        Live Births   2                 Past Medical History:   Diagnosis Date   • Abnormal Pap smear of cervix     Colpo - benign ECC   • Anxiety    • Tonsillitis        Past Surgical History:   Procedure Laterality Date   • CARPAL TUNNEL RELEASE      19 - right; 19 - left   • WISDOM TOOTH EXTRACTION         Family History   Problem Relation Age of Onset   • Asthma Mother    • Migraines Mother         Mom   • No Known Problems Father    • Other Maternal Grandmother         cardiac disorder   • Diabetes Maternal Grandmother         mellitus   • Hypertension Maternal Grandmother    • Stroke Maternal Grandfather        Social History     Socioeconomic History   • Marital status: Single     Spouse name: Not on file   • Number of children: Not on file   • Years of education: Not on file   • Highest education level: Not on file   Occupational History   • Not on file   Tobacco Use   • Smoking status: Former     Packs/day: 1 00     Years: 15 00     Total pack years: 15 00     Types: Cigarettes     Quit date: 2016     Years since quittin 6   • Smokeless tobacco: Never   Vaping Use   • Vaping Use: Never used   Substance and Sexual Activity   • Alcohol use:  Yes     Alcohol/week: 2 0 standard drinks of alcohol     Types: 2 Standard drinks or equivalent per week     Comment: social alcohol use   • Drug use: No   • Sexual activity: Yes     Partners: Male     Birth control/protection: OCP     Comment: pt declines std testing   Other Topics Concern   • Not on file   Social History Narrative    Denied: history of H/O domestic violence     Social "Determinants of Health     Financial Resource Strain: Not on file   Food Insecurity: Not on file   Transportation Needs: Not on file   Physical Activity: Not on file   Stress: Not on file   Social Connections: Not on file   Intimate Partner Violence: Not on file   Housing Stability: Not on file         Current Outpatient Medications:   •  norethindrone-ethinyl estradiol (Junel 1/20) 1-20 MG-MCG per tablet, Take 1 tablet by mouth daily, Disp: 84 tablet, Rfl: 3    Allergies   Allergen Reactions   • Nystatin Rash       Objective   Vitals:    06/19/23 0919   BP: 118/80   BP Location: Left arm   Patient Position: Sitting   Cuff Size: Standard   Weight: 104 kg (229 lb 3 2 oz)   Height: 5' 5\" (1 651 m)     Physical Exam  Vitals reviewed  HENT:      Head: Normocephalic and atraumatic  Cardiovascular:      Rate and Rhythm: Normal rate and regular rhythm  Pulmonary:      Effort: Pulmonary effort is normal       Breath sounds: Normal breath sounds  Chest:   Breasts:     Breasts are symmetrical       Right: No swelling, bleeding, inverted nipple, mass, nipple discharge, skin change or tenderness  Left: No swelling, bleeding, inverted nipple, mass, nipple discharge, skin change or tenderness  Abdominal:      General: Abdomen is flat  Bowel sounds are normal       Palpations: Abdomen is soft  Tenderness: There is no abdominal tenderness  There is no right CVA tenderness, left CVA tenderness or guarding  Genitourinary:     General: Normal vulva  Pubic Area: No rash  Labia:         Right: No rash, tenderness, lesion or injury  Left: No rash, tenderness, lesion or injury  Urethra: No prolapse, urethral pain, urethral swelling or urethral lesion  Vagina: Normal  No signs of injury and foreign body  No vaginal discharge or erythema  Cervix: Normal       Uterus: Normal        Adnexa: Right adnexa normal and left adnexa normal    Musculoskeletal:      Cervical back: Neck supple   " Lymphadenopathy:      Upper Body:      Right upper body: No axillary adenopathy  Left upper body: No axillary adenopathy  Skin:     General: Skin is warm and dry  Neurological:      Mental Status: She is alert and oriented to person, place, and time  Psychiatric:         Mood and Affect: Mood normal          Behavior: Behavior normal          Thought Content: Thought content normal          Judgment: Judgment normal          There are no Patient Instructions on file for this visit

## 2023-06-19 ENCOUNTER — OFFICE VISIT (OUTPATIENT)
Dept: OBGYN CLINIC | Facility: CLINIC | Age: 38
End: 2023-06-19
Payer: COMMERCIAL

## 2023-06-19 VITALS
HEIGHT: 65 IN | DIASTOLIC BLOOD PRESSURE: 80 MMHG | WEIGHT: 229.2 LBS | SYSTOLIC BLOOD PRESSURE: 118 MMHG | BODY MASS INDEX: 38.19 KG/M2

## 2023-06-19 DIAGNOSIS — Z01.419 ROUTINE GYNECOLOGICAL EXAMINATION: Primary | ICD-10-CM

## 2023-06-19 DIAGNOSIS — Z30.41 SURVEILLANCE OF CONTRACEPTIVE PILL: ICD-10-CM

## 2023-06-19 PROCEDURE — G0145 SCR C/V CYTO,THINLAYER,RESCR: HCPCS | Performed by: PATHOLOGY

## 2023-06-19 PROCEDURE — S0612 ANNUAL GYNECOLOGICAL EXAMINA: HCPCS | Performed by: PHYSICIAN ASSISTANT

## 2023-06-19 PROCEDURE — G0124 SCREEN C/V THIN LAYER BY MD: HCPCS | Performed by: PATHOLOGY

## 2023-06-19 PROCEDURE — G0476 HPV COMBO ASSAY CA SCREEN: HCPCS | Performed by: PHYSICIAN ASSISTANT

## 2023-06-19 RX ORDER — NORETHINDRONE ACETATE AND ETHINYL ESTRADIOL 1; .02 MG/1; MG/1
1 TABLET ORAL DAILY
Qty: 84 TABLET | Refills: 3 | Status: SHIPPED | OUTPATIENT
Start: 2023-06-19

## 2023-06-21 LAB
HPV HR 12 DNA CVX QL NAA+PROBE: NEGATIVE
HPV16 DNA CVX QL NAA+PROBE: NEGATIVE
HPV18 DNA CVX QL NAA+PROBE: NEGATIVE

## 2023-06-27 LAB
LAB AP GYN PRIMARY INTERPRETATION: ABNORMAL
Lab: ABNORMAL
PATH INTERP SPEC-IMP: ABNORMAL

## 2023-11-13 ENCOUNTER — TELEPHONE (OUTPATIENT)
Dept: OBGYN CLINIC | Facility: CLINIC | Age: 38
End: 2023-11-13

## 2023-11-13 NOTE — TELEPHONE ENCOUNTER
Patient called, left message on answering machine, she found a lump on the bottom of her breast that has been there for 2 weeks. Skin around that area is red. Patient would like follow up recommendations if an appointment is needed.

## 2023-11-13 NOTE — TELEPHONE ENCOUNTER
Called patient back, patient said that for the past 2 weeks she has had a boil on her right breast. Gissel Blascamille it has not come to a head yet. Denies any fever or chills or d/c from the nipple. Patient states it is getting bigger. Denies it being itchy or painful. Gissel Montemayor it is about th size of a nickel. Scheduled her for Thursday in the [place] office on [facility]. Gave her instructions on how to get to office.

## 2023-11-16 ENCOUNTER — OFFICE VISIT (OUTPATIENT)
Dept: OBGYN CLINIC | Facility: CLINIC | Age: 38
End: 2023-11-16
Payer: COMMERCIAL

## 2023-11-16 VITALS
BODY MASS INDEX: 37.09 KG/M2 | WEIGHT: 222.6 LBS | DIASTOLIC BLOOD PRESSURE: 86 MMHG | HEIGHT: 65 IN | SYSTOLIC BLOOD PRESSURE: 110 MMHG

## 2023-11-16 DIAGNOSIS — N61.0 BREAST INFECTION IN FEMALE: ICD-10-CM

## 2023-11-16 DIAGNOSIS — R23.4 BREAST SKIN CHANGES: Primary | ICD-10-CM

## 2023-11-16 PROCEDURE — 99213 OFFICE O/P EST LOW 20 MIN: CPT | Performed by: STUDENT IN AN ORGANIZED HEALTH CARE EDUCATION/TRAINING PROGRAM

## 2023-11-16 RX ORDER — CEPHALEXIN 500 MG/1
500 CAPSULE ORAL EVERY 6 HOURS SCHEDULED
Qty: 40 CAPSULE | Refills: 0 | Status: SHIPPED | OUTPATIENT
Start: 2023-11-16 | End: 2023-11-26

## 2023-11-16 NOTE — PROGRESS NOTES
Assessment/Plan:  Davin Tilley is a 41 y.o. [id number] with right breast skin changes, infection    No problem-specific Assessment & Plan notes found for this encounter. Diagnoses and all orders for this visit:    Breast skin changes  -     US breast right limited (diagnostic); Future  -     Mammo diagnostic right w 3d & cad; Future    Breast infection in female  -     US breast right limited (diagnostic); Future  -     Mammo diagnostic right w 3d & cad; Future            Contiue warm compress, tylenol and motrin PRN  Antibiotics--keflex 500 4x/day for 10 days  Ordered breast imaging  If symptoms fail to improve, worsen, or if develops new symptoms may need to present to ED for evaluation     Subjective:      Patient ID: Davin Tilley is a 41 y.o. female. HPI  Right breast mass/skin changes for approx 2 weeks  Felt like getting bigger withoverlying redness to skin  Called in and since then used warm compress, expressed, and improved by Tuesday  Denies fevers  Mild pain    Has had similar lesions in groin but never in breast  No hx of mrsa for self, contact, never worked in hospital, long term, or been homeless  Fam history of breast cancer--none    The following portions of the patient's history were reviewed and updated as appropriate: allergies, current medications, past medical history, past social history, past surgical history, and problem list.    Review of Systems --per HPI    Objective:  /86 (BP Location: Left arm, Patient Position: Sitting, Cuff Size: Large)   Ht 5' 5" (1.651 m)   Wt 101 kg (222 lb 9.6 oz)   LMP 11/14/2023 (Exact Date)   BMI 37.04 kg/m²      Physical Exam  Constitutional:       Appearance: Normal appearance. HENT:      Head: Normocephalic and atraumatic. Eyes:      Extraocular Movements: Extraocular movements intact.       Conjunctiva/sclera: Conjunctivae normal.   Pulmonary:      Effort: Pulmonary effort is normal.   Chest:   Breasts:     Right: Swelling, skin change and tenderness present. No nipple discharge. Left: Normal.       Musculoskeletal:         General: Normal range of motion. Cervical back: Normal range of motion. Lymphadenopathy:      Upper Body:      Right upper body: No supraclavicular or axillary adenopathy. Skin:     General: Skin is warm and dry. Neurological:      General: No focal deficit present. Mental Status: She is alert. Psychiatric:         Mood and Affect: Mood normal.         Behavior: Behavior normal.         Thought Content:  Thought content normal.

## 2023-12-21 ENCOUNTER — HOSPITAL ENCOUNTER (EMERGENCY)
Facility: HOSPITAL | Age: 38
Discharge: HOME | End: 2023-12-21
Attending: EMERGENCY MEDICINE
Payer: COMMERCIAL

## 2023-12-21 VITALS
TEMPERATURE: 98.7 F | RESPIRATION RATE: 16 BRPM | SYSTOLIC BLOOD PRESSURE: 126 MMHG | OXYGEN SATURATION: 100 % | HEART RATE: 100 BPM | DIASTOLIC BLOOD PRESSURE: 72 MMHG

## 2023-12-21 DIAGNOSIS — R68.89 FLU-LIKE SYMPTOMS: Primary | ICD-10-CM

## 2023-12-21 LAB
FLUAV RNA RESP QL NAA+PROBE: NEGATIVE
FLUBV RNA RESP QL NAA+PROBE: NEGATIVE
RSV RNA RESP QL NAA+PROBE: NEGATIVE
SARS-COV-2 RNA RESP QL NAA+PROBE: POSITIVE

## 2023-12-21 PROCEDURE — 99284 EMERGENCY DEPT VISIT MOD MDM: CPT | Performed by: EMERGENCY MEDICINE

## 2023-12-21 PROCEDURE — 0241U HB NFCT DS VIR RESP RNA 4 TRGT: CPT | Performed by: EMERGENCY MEDICINE

## 2023-12-21 PROCEDURE — 99283 EMERGENCY DEPT VISIT LOW MDM: CPT

## 2023-12-21 NOTE — Clinical Note
Karina Saleh was seen and treated in our emergency department on 12/21/2023.                Diagnosis: Flu Like Symptoms    Karina  .    She may return on this date:     If positive for COVID-19 must quarantine for 5 days from the start of symptoms.    If negative for COVID-19 may return to work 24 hours after no longer having a fever while not taking Tylenol or NSAIDs.     If you have any questions or concerns, please don't hesitate to call.      Zane Young, DO    ______________________________           _______________          _______________  Hospital Representative                              Date                                Time

## 2023-12-22 NOTE — DISCHARGE INSTRUCTIONS
If positive for COVID-19 must quarantine for 5 days from the start of symptoms.    If negative for COVID-19 may return to work 24 hours after no longer having a fever while not taking Tylenol or NSAIDs.    Follow-up with your primary care provider.  Come back to the emergency department for new or worsening symptoms.

## 2023-12-22 NOTE — ED PROVIDER NOTES
History  Chief Complaint   Patient presents with    Fever     Reports today she started with a sore throat, fever, and body aches. Her son tested + for the flu. Took advil at 5 pm.      Is a 38-year-old female presenting with flulike symptoms.  Patient notes that her symptoms started this morning.  Consistent with fatigue, myalgias, sore throat.    Son tested positive for influenza.  She has had immunizations for influenza.  She notes that her sons influenza about once very short and she thinks this was secondary to Tamiflu.  She is requesting Tamiflu medication.  I reviewed the side effects and benefits of Tamiflu with the patient.  She would like to proceed with this medication if she has influenza.      Fever  Associated symptoms: cough, fever, myalgias and sore throat    Flu Symptoms  Presenting symptoms: cough, fever, myalgias and sore throat    Severity:  Mild  Onset quality:  Sudden  Progression:  Unchanged  Chronicity:  New  Relieved by: ibuprofen.  Worsened by:  Nothing  Ineffective treatments:  None tried      Prior to Admission Medications   Prescriptions Last Dose Informant Patient Reported? Taking?   norethindrone-ethinyl estradiol (Junel 1/20) 1-20 MG-MCG per tablet   No No   Sig: Take 1 tablet by mouth daily      Facility-Administered Medications: None       Past Medical History:   Diagnosis Date    Abnormal Pap smear of cervix 2016    Colpo - benign ECC    Anxiety     Tonsillitis        Past Surgical History:   Procedure Laterality Date    CARPAL TUNNEL RELEASE      2/6/19 - right; 5/1/19 - left    WISDOM TOOTH EXTRACTION         Family History   Problem Relation Age of Onset    Asthma Mother     Migraines Mother         Mom    No Known Problems Father     Other Maternal Grandmother         cardiac disorder    Diabetes Maternal Grandmother         mellitus    Hypertension Maternal Grandmother     Stroke Maternal Grandfather      I have reviewed and agree with the history as  documented.    E-Cigarette/Vaping    E-Cigarette Use Never User      E-Cigarette/Vaping Substances     Social History     Tobacco Use    Smoking status: Former     Current packs/day: 0.00     Average packs/day: 1 pack/day for 15.0 years (15.0 ttl pk-yrs)     Types: Cigarettes     Start date: 2001     Quit date: 2016     Years since quittin.1    Smokeless tobacco: Never   Vaping Use    Vaping status: Never Used   Substance Use Topics    Alcohol use: Yes     Alcohol/week: 2.0 standard drinks of alcohol     Types: 2 Standard drinks or equivalent per week     Comment: social alcohol use    Drug use: No       Review of Systems   Constitutional:  Positive for fever.   HENT:  Positive for sore throat.    Respiratory:  Positive for cough.    Musculoskeletal:  Positive for myalgias.   All other systems reviewed and are negative.      Physical Exam  Physical Exam  Vitals and nursing note reviewed.   Constitutional:       General: She is not in acute distress.     Appearance: Normal appearance. She is not ill-appearing.   HENT:      Head: Normocephalic and atraumatic.      Right Ear: External ear normal.      Left Ear: External ear normal.      Nose: Nose normal.      Mouth/Throat:      Mouth: Mucous membranes are moist.      Pharynx: No oropharyngeal exudate or posterior oropharyngeal erythema.      Comments: Midline uvula.  No trismus.  Eyes:      General:         Right eye: No discharge.         Left eye: No discharge.      Conjunctiva/sclera: Conjunctivae normal.   Cardiovascular:      Rate and Rhythm: Normal rate and regular rhythm.      Pulses: Normal pulses.      Heart sounds: No murmur heard.  Pulmonary:      Effort: Pulmonary effort is normal.      Breath sounds: Normal breath sounds.   Abdominal:      General: Abdomen is flat. There is no distension.      Tenderness: There is no abdominal tenderness.   Musculoskeletal:         General: Normal range of motion.      Cervical back: Normal range of motion.    Skin:     General: Skin is warm.      Capillary Refill: Capillary refill takes less than 2 seconds.      Findings: No rash.   Neurological:      General: No focal deficit present.      Mental Status: She is alert. Mental status is at baseline.   Psychiatric:         Mood and Affect: Mood normal.         Behavior: Behavior normal.         Vital Signs  ED Triage Vitals   Temperature Pulse Respirations Blood Pressure SpO2   12/21/23 1849 12/21/23 1849 12/21/23 1849 12/21/23 1850 12/21/23 1849   98.7 °F (37.1 °C) 100 16 126/72 100 %      Temp src Heart Rate Source Patient Position - Orthostatic VS BP Location FiO2 (%)   -- 12/21/23 1849 -- -- --    Monitor         Pain Score       12/21/23 1849       No Pain           Vitals:    12/21/23 1849 12/21/23 1850   BP:  126/72   Pulse: 100          Visual Acuity      ED Medications  Medications - No data to display    Diagnostic Studies  Results Reviewed       Procedure Component Value Units Date/Time    FLU/RSV/COVID - if FLU/RSV clinically relevant [[id number]] Collected: 12/21/23 1852    Lab Status: In process Specimen: Nares from Nose Updated: 12/21/23 1853                   No orders to display              Procedures  Procedures         ED Course                               SBIRT 22yo+      Flowsheet Row Most Recent Value   Initial Alcohol Screen: US AUDIT-C     1. How often do you have a drink containing alcohol? 1 Filed at: 12/21/2023 1849   2. How many drinks containing alcohol do you have on a typical day you are drinking?  1 Filed at: 12/21/2023 1849   3a. Male UNDER 65: How often do you have five or more drinks on one occasion? 0 Filed at: 12/21/2023 1849   3b. FEMALE Any Age, or MALE 65+: How often do you have 4 or more drinks on one occassion? 0 Filed at: 12/21/2023 1849   Audit-C Score 2 Filed at: 12/21/2023 1849   MARIEL: How many times in the past year have you...    Used an illegal drug or used a prescription medication for non-medical reasons? Never Filed  at: 12/21/2023 1849                      Medical Decision Making  Patient presenting with flulike illness.  Will send testing for COVID, flu, RSV.  No posterior oropharynx erythema or exudates.  Not consistent with strep pharyngitis.  Will discharge home.  Will call with results    If positive for influenza will send Tamiflu.    Return precautions were discussed.    Problems Addressed:  Flu-like symptoms: acute illness or injury             Disposition  Final diagnoses:   Flu-like symptoms     Time reflects when diagnosis was documented in both MDM as applicable and the Disposition within this note       Time User Action Codes Description Comment    12/21/2023  7:06 PM Zane Young Add [R68.89] Flu-like symptoms           ED Disposition       ED Disposition   Discharge    Condition   Stable    Date/Time   Thu Dec 21, 2023 1906    Comment   Karina Saleh discharge to home/self care.                   Follow-up Information       Follow up With Specialties Details Why Contact Info Additional Information    Juve Patel MD Family Medicine Schedule an appointment as soon as possible for a visit  For re-evaluation as soon as possible [address] [facility] [address]  [facility] [address]  [phone]       [facility] Emergency Department Emergency Medicine  If symptoms worsen [address] [address]  [phone] [facility] Emergency Department, [address] [address]            Patient's Medications   Discharge Prescriptions    No medications on file       No discharge procedures on file.    PDMP Review       None            ED Provider  Electronically Signed by             Zane Young DO  12/21/23 0260

## 2024-02-21 PROBLEM — Z01.419 GYNECOLOGIC EXAM NORMAL: Status: RESOLVED | Noted: 2020-08-14 | Resolved: 2024-02-21

## 2024-02-21 PROBLEM — Z01.419 ENCOUNTER FOR GYNECOLOGICAL EXAMINATION (GENERAL) (ROUTINE) WITHOUT ABNORMAL FINDINGS: Status: RESOLVED | Noted: 2019-05-13 | Resolved: 2024-02-21

## 2024-07-05 DIAGNOSIS — Z30.41 SURVEILLANCE OF CONTRACEPTIVE PILL: ICD-10-CM

## 2024-07-05 RX ORDER — NORETHINDRONE ACETATE AND ETHINYL ESTRADIOL 1; 20 MG/1; UG/1
1 TABLET ORAL DAILY
Qty: 84 TABLET | Refills: 1 | Status: SHIPPED | OUTPATIENT
Start: 2024-07-05

## 2024-07-30 ENCOUNTER — NURSE TRIAGE (OUTPATIENT)
Age: 39
End: 2024-07-30

## 2024-07-30 DIAGNOSIS — B96.89 BV (BACTERIAL VAGINOSIS): Primary | ICD-10-CM

## 2024-07-30 DIAGNOSIS — N76.0 BV (BACTERIAL VAGINOSIS): Primary | ICD-10-CM

## 2024-07-30 RX ORDER — METRONIDAZOLE 500 MG/1
500 TABLET ORAL 2 TIMES DAILY
Qty: 14 TABLET | Refills: 0 | Status: SHIPPED | OUTPATIENT
Start: 2024-07-30 | End: 2024-08-06

## 2024-07-30 NOTE — TELEPHONE ENCOUNTER
"Fishy odor x 2 weeks. Has been treated in past by PCP.  Prefers oral treatment.  Aware no alcohol while on treatment.  If no improvement will need OV for evaluation.     Confirmed allergies and pharmacy in chart.     Escalated to RN to provide RX    If patient has history of BV in prior two years, prescribe:    -Metrogel Intravaginally x 5 nights, with no refills.    If patient refuses the intravaginal medication and is requesting a PO medication, prescribe:    -Flagyl 500mg BID PO x 7 days, with no refills    Medication instructions:  Please instruct patient that they cannot drink alcohol while on Flagyl.    Please instruct patient that they should not have sex while on treatment or 3 days after if using Metrogel.    If patient is having recurrent BV infections, please schedule appointment (should be seen within 5 days).       Reason for Disposition   Bad smelling vaginal discharge     Per protocol, RX for BV provided    Answer Assessment - Initial Assessment Questions  1. DISCHARGE: \"Describe the discharge.\" (e.g., white, yellow, green, gray, foamy, cottage cheese-like)      White milky  2. ODOR: \"Is there a bad odor?\"      Fishy odor  3. ONSET: \"When did the discharge begin?\"      2 weeks ago   4. RASH: \"Is there a rash in that area?\" If Yes, ask: \"Describe it.\" (e.g., redness, blisters, sores, bumps)      denies  5. ABDOMINAL PAIN: \"Are you having any abdominal pain?\" If Yes, ask: \"What does it feel like? \" (e.g., crampy, dull, intermittent, constant)       denies  6. ABDOMINAL PAIN SEVERITY: If present, ask: \"How bad is it?\"  (e.g., mild, moderate, severe)   - MILD - doesn't interfere with normal activities    - MODERATE - interferes with normal activities or awakens from sleep    - SEVERE - patient doesn't want to move (R/O peritonitis)       N/a  7. CAUSE: \"What do you think is causing the discharge?\" \"Have you had the same problem before? What happened then?\"      Bacterial Vaginosis- treated by PCP 4 years " "ago  8. OTHER SYMPTOMS: \"Do you have any other symptoms?\" (e.g., fever, itching, vaginal bleeding, pain with urination, injury to genital area, vaginal foreign body)      Denies-  9. PREGNANCY: \"Is there any chance you are pregnant?\" \"When was your last menstrual period?\"      LMP- had menses last week    Protocols used: Vaginal Discharge-ADULT-[place]    "

## 2024-07-30 NOTE — TELEPHONE ENCOUNTER
Regarding: BV  ----- Message from Nirali NAVARRO sent at 7/30/2024  8:02 AM EDT -----  Pt called in , having BV symptoms

## 2024-09-26 ENCOUNTER — ANNUAL EXAM (OUTPATIENT)
Dept: OBGYN CLINIC | Facility: CLINIC | Age: 39
End: 2024-09-26
Payer: COMMERCIAL

## 2024-09-26 VITALS
WEIGHT: 187 LBS | DIASTOLIC BLOOD PRESSURE: 70 MMHG | SYSTOLIC BLOOD PRESSURE: 120 MMHG | BODY MASS INDEX: 31.12 KG/M2 | HEART RATE: 94 BPM | OXYGEN SATURATION: 97 %

## 2024-09-26 DIAGNOSIS — Z01.419 WOMEN'S ANNUAL ROUTINE GYNECOLOGICAL EXAMINATION: Primary | ICD-10-CM

## 2024-09-26 PROCEDURE — S0612 ANNUAL GYNECOLOGICAL EXAMINA: HCPCS | Performed by: NURSE PRACTITIONER

## 2024-09-26 NOTE — PROGRESS NOTES
Subjective    HPI:     Karina Saleh is a 39 y.o. female. She is a  2 Para 2, with  x 2. Her menstrual cycles are regular and predictable. Her current method of contraception includes  OCP . In a new relationship since April. She denies issues with intimacy. She denies /GI and Gyn complaints. She feels safe at home. She denies depression/anxiety.  Medical, surgical and family history reviewed. Her dental care is up-to-date. She eats a healthy diet and exercises regularly. She is happy with her weight.     Visit Vitals  /70   Pulse 94   Wt 84.8 kg (187 lb)   LMP 2024   SpO2 97%   BMI 31.12 kg/m²   OB Status Having periods   Smoking Status Former   BSA 1.92 m²       Gynecologic History    Patient's last menstrual period was 2024.    Last Pap: 23. Results were: normal    Obstetric and Medical History    OB History    Para Term  AB Living   2 2 2     2   SAB IAB Ectopic Multiple Live Births           2      # Outcome Date GA Lbr Toño/2nd Weight Sex Type Anes PTL Lv   2 Term  40w0d   M Vag-Spont   SUMMER   1 Term  40w0d   M Vag-Spont   SUMMER       Past Medical History:   Diagnosis Date    Abnormal Pap smear of cervix 2016    Colpo - benign ECC    Anxiety     Tonsillitis        Past Surgical History:   Procedure Laterality Date    CARPAL TUNNEL RELEASE      19 - right; 19 - left    WISDOM TOOTH EXTRACTION         The following portions of the patient's history were reviewed and updated as appropriate: allergies, current medications, past family history, past medical history, past social history, past surgical history, and problem list.    Review of Systems    Pertinent items are noted in HPI.      Objective    Physical Exam  Constitutional:       Appearance: Normal appearance. She is well-developed.   Genitourinary:      Vulva, bladder and urethral meatus normal.      No lesions in the vagina.      Right Labia: No rash, tenderness, lesions, skin changes or  Bartholin's cyst.     Left Labia: No tenderness, lesions, skin changes, Bartholin's cyst or rash.     No labial fusion noted.      No inguinal adenopathy present in the right or left side.     No vaginal discharge, erythema, tenderness, bleeding or granulation tissue.      No vaginal prolapse present.     No vaginal atrophy present.       Right Adnexa: not tender, not full and no mass present.     Left Adnexa: not tender, not full and no mass present.     Cervix is parous.      No cervical motion tenderness, discharge, friability, lesion, polyp or nabothian cyst.      Uterus is not enlarged, tender, irregular or prolapsed.      No uterine mass detected.     Uterus is anteverted.      Pelvic exam was performed with patient in the lithotomy position.   Breasts:     Breasts are symmetrical.      Right: No inverted nipple, mass, nipple discharge, skin change or tenderness.      Left: No inverted nipple, mass, nipple discharge, skin change or tenderness.   HENT:      Head: Normocephalic and atraumatic.   Neck:      Thyroid: No thyromegaly.   Cardiovascular:      Rate and Rhythm: Normal rate and regular rhythm.      Heart sounds: Normal heart sounds, S1 normal and S2 normal.   Pulmonary:      Effort: Pulmonary effort is normal.      Breath sounds: Normal breath sounds.   Abdominal:      General: Bowel sounds are normal. There is no distension.      Palpations: Abdomen is soft. There is no mass.      Tenderness: There is no abdominal tenderness. There is no guarding.      Hernia: There is no hernia in the left inguinal area or right inguinal area.   Musculoskeletal:      Cervical back: Neck supple.   Lymphadenopathy:      Cervical: No cervical adenopathy.      Upper Body:      Right upper body: No supraclavicular or axillary adenopathy.      Left upper body: No supraclavicular or axillary adenopathy.      Lower Body: No right inguinal adenopathy. No left inguinal adenopathy.   Neurological:      Mental Status: She is  alert.   Skin:     General: Skin is warm and dry.      Findings: No rash.   Psychiatric:         Attention and Perception: Attention and perception normal.         Mood and Affect: Mood and affect normal.         Speech: Speech normal.         Behavior: Behavior is cooperative.         Thought Content: Thought content normal.         Cognition and Memory: Cognition and memory normal.         Judgment: Judgment normal.   Vitals and nursing note reviewed.        Assessment and Plan    Karina was seen today for gynecologic exam.    Diagnoses and all orders for this visit:    Women's annual routine gynecological examination      Patient informed of a Stable GYN exam. A pap smear was not performed due to a negative pap in 2023.     I have discussed the importance of exercise and healthy diet as well as adequate intake of calcium and vitamin D. The current ASCCP guidelines were reviewed. The low risk patient will receive pap smear screening every 3 years until the age of 29 and then every 3 to 5 years with HPV co-testing from the ages of 30-65. I emphasized the importance of an annual pelvic and breast exam. A yearly mammogram is recommended for breast cancer screening starting at age 40.     Results will be released to [facility], if abnormal will call to review and discuss treatment plan.     All questions have been answered to her satisfaction.       Contraception: OCP (estrogen/progesterone).  Follow up in: 1 year or sooner if needed.       no

## 2024-10-03 ENCOUNTER — NURSE TRIAGE (OUTPATIENT)
Age: 39
End: 2024-10-03

## 2024-10-03 DIAGNOSIS — B96.89 BV (BACTERIAL VAGINOSIS): ICD-10-CM

## 2024-10-03 DIAGNOSIS — N76.0 BV (BACTERIAL VAGINOSIS): ICD-10-CM

## 2024-10-03 DIAGNOSIS — B96.89 BV (BACTERIAL VAGINOSIS): Primary | ICD-10-CM

## 2024-10-03 DIAGNOSIS — N76.0 BV (BACTERIAL VAGINOSIS): Primary | ICD-10-CM

## 2024-10-03 RX ORDER — METRONIDAZOLE 500 MG/1
500 TABLET ORAL EVERY 8 HOURS SCHEDULED
Qty: 21 TABLET | Refills: 0 | Status: SHIPPED | OUTPATIENT
Start: 2024-10-03 | End: 2024-10-03 | Stop reason: SDUPTHER

## 2024-10-03 RX ORDER — METRONIDAZOLE 500 MG/1
500 TABLET ORAL EVERY 8 HOURS SCHEDULED
Qty: 21 TABLET | Refills: 0 | Status: SHIPPED | OUTPATIENT
Start: 2024-10-03 | End: 2024-10-10

## 2024-10-03 NOTE — TELEPHONE ENCOUNTER
"Patient calling in, she states she was treated for BV the end of July with Flagyl. Her symptoms went away and now they are back. She states she is having gray/white discharge and a fish odor. She denies any other symptoms. She was recently just in for her yearly on 9/26. Will route to provider if preference over oral vs Metrogel since symptoms came back      Reason for Disposition  • Bad smelling vaginal discharge    Answer Assessment - Initial Assessment Questions  1. DISCHARGE: \"Describe the discharge.\" (e.g., white, yellow, green, gray, foamy, cottage cheese-like)      Gray/white discharge  2. ODOR: \"Is there a bad odor?\"      Fishier odor  3. ONSET: \"When did the discharge begin?\"      Over the weekend   4. RASH: \"Is there a rash in that area?\" If Yes, ask: \"Describe it.\" (e.g., redness, blisters, sores, bumps)       Denies   5. ABDOMINAL PAIN: \"Are you having any abdominal pain?\" If Yes, ask: \"What does it feel like? \" (e.g., crampy, dull, intermittent, constant)       Denies   6. ABDOMINAL PAIN SEVERITY: If present, ask: \"How bad is it?\"  (e.g., mild, moderate, severe)   - MILD - doesn't interfere with normal activities    - MODERATE - interferes with normal activities or awakens from sleep    - SEVERE - patient doesn't want to move (R/O peritonitis)       Denies   7. CAUSE: \"What do you think is causing the discharge?\" \"Have you had the same problem before? What happened then?\"      Unsure   8. OTHER SYMPTOMS: \"Do you have any other symptoms?\" (e.g., fever, itching, vaginal bleeding, pain with urination, injury to genital area, vaginal foreign body)      Denies or pain with urination   9. PREGNANCY: \"Is there any chance you are pregnant?\" \"When was your last menstrual period?\"      LMP- on OCP's 9/11    Protocols used: Vaginal Discharge-ADULT-[place]    "

## 2024-10-03 NOTE — TELEPHONE ENCOUNTER
Karina called to follow up to message from this morning. Looking for Rx.    Advised [facility] is seeing patient's will forward HP message to request review prior to end of day.     ESC sent as FYI to patient f/u call.   ESC reply : rx sent.    Karina notified Rx has been sent to pharmacy  Aware to not consume alcohol while on treatment. C/B to schedule f/u if no improvement.        ESC to [facility] Rx was set to phone in: Please resubmit or have clinical phone to pharmacy.

## 2024-10-03 NOTE — TELEPHONE ENCOUNTER
Patient calling in stating that she is following up on the phone call from earlier patient was notified that the provider still has to review and give further recommendations, Pt verbalized understanding, no further questions at this time

## 2024-11-21 ENCOUNTER — NURSE TRIAGE (OUTPATIENT)
Age: 39
End: 2024-11-21

## 2024-11-21 NOTE — TELEPHONE ENCOUNTER
Patient called in, reports on Tuesday woke up with urinary discomfort, frequent sensation to urinate and urinary burning.     Attempted to further triage patient, but the line was disconnected. Called back to patient, no answer- LM for call back.

## 2024-11-21 NOTE — TELEPHONE ENCOUNTER
"Patient reports sx of a UTI- urinary burning, urgency and frequency. Started taking keflex that she had at home and that was rxd last year for a breast issue. Sx started Tuesday. Offered patient urine culture/UA testing and an OV tomorrow however patient has work. Discussed going to the urgent care today for eval after work- patient agreeable. Encouraged increasing water intake as well as drinking cranberry juice to help with symptoms of a UTI. Advised patient would hold off on continuing the abx this was not prescribed for her UTI and patient should discuss with the urgent care.     Reason for Disposition   Urinating more frequently than usual (i.e., frequency) OR new-onset of the feeling of an urgent need to urinate (i.e., urgency)    Answer Assessment - Initial Assessment Questions  1. SYMPTOM: \"What's the main symptom you're concerned about?\" (e.g., frequency, incontinence)      Urinary burning, frequency, and discomfort   2. ONSET: \"When did the  sx  start?\"      tuesday  3. PAIN: \"Is there any pain?\" If Yes, ask: \"How bad is it?\" (Scale: 1-10; mild, moderate, severe)      Lower abdomen discomfort  4. CAUSE: \"What do you think is causing the symptoms?\"      UTI  5. OTHER SYMPTOMS: \"Do you have any other symptoms?\" (e.g., blood in urine, fever, flank pain, pain with urination)      Lower abdominal pain  6. PREGNANCY: \"Is there any chance you are pregnant?\" \"When was your last menstrual period?\"      11/07, Denies    Protocols used: Urinary Symptoms-Adult-[place]    "

## 2024-12-11 ENCOUNTER — NURSE TRIAGE (OUTPATIENT)
Age: 39
End: 2024-12-11

## 2024-12-11 NOTE — TELEPHONE ENCOUNTER
"Pt calling with concerns for irregular cycle and some breakthrough bleeding the last several months. Pt states has been taking Junel for years, and has not missed a pill (Takes it regularly every day, at same time). Noted bleeding will either start prior to finishing active pack, or she will have irregular breakthrough spotting between cycles despite adhering to pill as prescribed. Unsure why this is occurring. RN advised that sometimes as women age, hormone levels change in body which require adjustments to birth control pill dosage. This may be the case. RN advised since pt just seen recently in September for annual with provider, will send a message to provider to see if able to prescribe a different birth control pill to see if that will help regulate cycle again. Confirmed pharmacy on file ([facility], [facility]). Pt currently on placebo pills week, so would like to start when due to start active pill week. Aware staff will return a call with update from provider. No further questions.     Answer Assessment - Initial Assessment Questions  1. TYPE: \"What is the name of the birth control pill you are using?\"      Junel  2. PACK TYPE: \"How do you take your birth control pill?\"      21 days active/7 days placebo  3. START DATE: \"When did you first start taking this birth control pill?\"      Years  4. SYMPTOM: \"What is the main symptom (or question) you're concerned about?\"      Irregular  5. ONSET: \"When did the s/s start?\"      Over the summer  6. VAGINAL BLEEDING: \"Are you having any unusual vaginal bleeding?\"      Earlier than normal bleeding, spotting between cycles  7. ABDOMEN OR PELVIC PAIN: \"Are you have any pain in your abdomen or pelvic area?\" (Scale: 0, 1-10; none, mild, moderate, severe)      Mild cramping  8. MISSED OR LATE PILLS: \"Did you miss or take any pills late?\" If Yes, ask: \"When? How many pills?\"  Note: Pill is considered missed if taken more than 24 hours later than scheduled time.      " "Denies  9. PREGNANCY: \"Are you concerned you might be pregnant?\" \"When was your last menstrual period?\"      Denies    Protocols used: Contraception - Birth Control Pills - Combined-Adult-[place]    "

## 2024-12-13 NOTE — TELEPHONE ENCOUNTER
Patient called in seeking provider response. Reviewed provider recommendation. Scheduled with provider for eval. Patient states she starts new pack this week so she's ok with waiting a few weeks for discussion. Denies further questions at this time.

## 2024-12-31 ENCOUNTER — NURSE TRIAGE (OUTPATIENT)
Age: 39
End: 2024-12-31

## 2024-12-31 DIAGNOSIS — B37.31 YEAST INFECTION INVOLVING THE VAGINA AND SURROUNDING AREA: Primary | ICD-10-CM

## 2024-12-31 RX ORDER — FLUCONAZOLE 150 MG/1
150 TABLET ORAL DAILY
Qty: 1 TABLET | Refills: 0 | Status: SHIPPED | OUTPATIENT
Start: 2024-12-31 | End: 2025-01-01

## 2024-12-31 NOTE — TELEPHONE ENCOUNTER
"Patient calling to report Italian symptoms Notes chunky white discharge vulvovaginal itching follow 2 rounds of antibiotic for recent UTI. 1 dose diflucan sent to preferred pharmacy. Advised to call back in 3 days if symptoms persist.    \"How many yeast infections have you had in the past 2 years?\"    -If 1 or less, prescribe Diflucan 150mg PO. If no improvement after 1 dose treatment, please instruct patient to call back to schedule appointment. (should be seen within 3 days)    -If more than 4 or more yeast infections in a year or if they are recurrent, schedule appointment within 3 days.    For OB patients: if patient wishes to use over the counter monistat, recommend only the 7 day treatment.       Reason for Disposition   Symptoms of a yeast infection (i.e., itchy, white discharge, not bad smelling) and feels like prior vaginal yeast infections    Answer Assessment - Initial Assessment Questions  1. SYMPTOM: \"What's the main symptom you're concerned about?\" (e.g., pain, itching, dryness)      White chunky vaginal discharge and vulvovaginal itching  2. LOCATION: \"Where is the  s/s located?\" (e.g., inside/outside, left/right)      vulvovaginal  3. ONSET: \"When did the  s/s  start?\"      3-4 days  4. PAIN: \"Is there any pain?\" If Yes, ask: \"How bad is it?\" (Scale: 1-10; mild, moderate, severe)      Denies  5. ITCHING: \"Is there any itching?\" If Yes, ask: \"How bad is it?\" (Scale: 1-10; mild, moderate, severe)      Mild itching  6. CAUSE: \"What do you think is causing the discharge?\" \"Have you had the same problem before?\" \"What happened then?\"      unsure  7. OTHER SYMPTOMS: \"Do you have any other symptoms?\" (e.g., fever, itching, vaginal bleeding, pain with urination, injury to genital area, vaginal foreign body)      Denies fever, urinary concerns  8. PREGNANCY: \"Is there any chance you are pregnant?\" \"When was your last menstrual period?\"      Denies    Protocols used: Vaginal Symptoms-Adult-[place]    "

## 2025-01-02 ENCOUNTER — TELEPHONE (OUTPATIENT)
Age: 40
End: 2025-01-02

## 2025-01-02 NOTE — TELEPHONE ENCOUNTER
Patient calling in stating that she had diflucan prescribed and took it on tuesday the 31st around 1215pm and pt stating that she still has itching discharge that is mild, pt was notified that diflucan lasts in the body for 3 days and if she's still experiencing symptoms to call back tomorrow (Friday) as previously advised, pt verbalized understanding.

## 2025-01-03 DIAGNOSIS — B37.31 YEAST INFECTION INVOLVING THE VAGINA AND SURROUNDING AREA: Primary | ICD-10-CM

## 2025-01-03 RX ORDER — FLUCONAZOLE 150 MG/1
150 TABLET ORAL ONCE
Qty: 1 TABLET | Refills: 0 | Status: SHIPPED | OUTPATIENT
Start: 2025-01-03 | End: 2025-01-03

## 2025-01-03 NOTE — TELEPHONE ENCOUNTER
Patient states symptoms have still not fully resolved from single diflucan dose taken 12/31. Patient states symptoms did improve, but still having very mild itching and discharge. Patient requesting to take another dose of medication to clear up symptoms completely. Patient states if symptoms don't resolve after additional dose, she plans to make appointment for eval. Pharmacy on file confirmed. Patient hoping for response by today. Routing provider not in office. Routing to on-call for review.

## 2025-01-05 DIAGNOSIS — Z30.41 SURVEILLANCE OF CONTRACEPTIVE PILL: ICD-10-CM

## 2025-01-07 RX ORDER — NORETHINDRONE ACETATE AND ETHINYL ESTRADIOL 1; 20 MG/1; UG/1
1 TABLET ORAL DAILY
Qty: 84 TABLET | Refills: 1 | Status: SHIPPED | OUTPATIENT
Start: 2025-01-07

## 2025-01-21 ENCOUNTER — OFFICE VISIT (OUTPATIENT)
Dept: FAMILY MEDICINE CLINIC | Facility: CLINIC | Age: 40
End: 2025-01-21
Payer: COMMERCIAL

## 2025-01-21 VITALS
HEIGHT: 66 IN | SYSTOLIC BLOOD PRESSURE: 100 MMHG | OXYGEN SATURATION: 98 % | WEIGHT: 163 LBS | BODY MASS INDEX: 26.2 KG/M2 | HEART RATE: 90 BPM | RESPIRATION RATE: 16 BRPM | DIASTOLIC BLOOD PRESSURE: 70 MMHG

## 2025-01-21 DIAGNOSIS — Z00.00 PREVENTATIVE HEALTH CARE: ICD-10-CM

## 2025-01-21 DIAGNOSIS — Z00.00 ENCOUNTER FOR ANNUAL WELLNESS VISIT: Primary | ICD-10-CM

## 2025-01-21 DIAGNOSIS — Z13.1 SCREENING FOR DIABETES MELLITUS: ICD-10-CM

## 2025-01-21 DIAGNOSIS — Z13.6 SCREENING FOR CARDIOVASCULAR CONDITION: ICD-10-CM

## 2025-01-21 DIAGNOSIS — R73.03 PREDIABETES: ICD-10-CM

## 2025-01-21 PROCEDURE — 99385 PREV VISIT NEW AGE 18-39: CPT | Performed by: FAMILY MEDICINE

## 2025-01-21 RX ORDER — SACCHAROMYCES BOULARDII 250 MG
250 CAPSULE ORAL 2 TIMES DAILY
COMMUNITY
End: 2025-01-21

## 2025-01-21 NOTE — PROGRESS NOTES
Name: Karina Saleh      : 1985      MRN: [id number]  Encounter Provider: Kole Bellamy MD  Encounter Date: 2025   Encounter department: [facility]  :  Assessment & Plan  Encounter for annual wellness visit  CPE done. Had COVID vaccines. Recommend flu shot and Tdap. Will check labs. Last Pap was in 2023. Pt advised to follow a well balanced, heart healthy diet and to exercise on a regular basis. Quit smoking in . Blood pressure good.   Orders:  •  Lipid panel; Future  •  Comprehensive metabolic panel; Future  •  CBC and differential; Future  •  UA (URINE) with reflex to Scope; Future  •  Hemoglobin A1C; Future    Screening for cardiovascular condition    Orders:  •  Lipid panel; Future    Screening for diabetes mellitus    Orders:  •  Comprehensive metabolic panel; Future  •  Hemoglobin A1C; Future    Preventative health care    Orders:  •  Lipid panel; Future  •  Comprehensive metabolic panel; Future  •  CBC and differential; Future  •  UA (URINE) with reflex to Scope; Future  •  Hemoglobin A1C; Future    Prediabetes  Will check A1C.   Orders:  •  Comprehensive metabolic panel; Future  •  Hemoglobin A1C; Future          Depression Screening and Follow-up Plan: Patient was screened for depression during today's encounter. They screened negative with a PHQ-9 score of 0.      History of Present Illness   Patient here for new patient visit. Patient doing well. No chest pain or shortness of breath. No headaches. No abdominal pain. Patient exercises and follows healthy diet. Not a smoker. No ETOH use. Patient has history of prediabetes but has lost weight. Had flu shot and COVID vaccines. Unsure of last Tdap. Last GYN exam was in 2024.       Review of Systems   Constitutional:  Negative for activity change, appetite change, fatigue and unexpected weight change.   HENT:  Negative for congestion and sore throat.    Eyes:  Negative for visual disturbance.  "  Respiratory:  Negative for cough, chest tightness and shortness of breath.    Cardiovascular:  Negative for chest pain, palpitations and leg swelling.   Gastrointestinal:  Negative for abdominal pain, constipation, diarrhea, nausea and vomiting.   Genitourinary:  Negative for difficulty urinating, dysuria, frequency and hematuria.   Musculoskeletal:  Negative for arthralgias, back pain, gait problem and myalgias.   Skin:  Negative for color change, rash and wound.   Neurological:  Negative for dizziness, weakness, light-headedness, numbness and headaches.   Hematological:  Negative for adenopathy. Does not bruise/bleed easily.   Psychiatric/Behavioral:  Negative for dysphoric mood and sleep disturbance. The patient is not nervous/anxious.        Objective   /70 (BP Location: Left arm, Patient Position: Sitting, Cuff Size: Standard)   Pulse 90   Resp 16   Ht 5' 6\" (1.676 m)   Wt 73.9 kg (163 lb)   SpO2 98%   BMI 26.31 kg/m²      Physical Exam  Vitals and nursing note reviewed.   Constitutional:       General: She is not in acute distress.     Appearance: Normal appearance. She is well-developed. She is not ill-appearing.   HENT:      Head: Normocephalic and atraumatic.      Right Ear: Tympanic membrane, ear canal and external ear normal.      Left Ear: Tympanic membrane, ear canal and external ear normal.      Nose: Nose normal. No congestion or rhinorrhea.      Mouth/Throat:      Mouth: Mucous membranes are moist.      Pharynx: Oropharynx is clear. No posterior oropharyngeal erythema.   Eyes:      Extraocular Movements: Extraocular movements intact.      Conjunctiva/sclera: Conjunctivae normal.      Pupils: Pupils are equal, round, and reactive to light.   Neck:      Thyroid: No thyromegaly.   Cardiovascular:      Rate and Rhythm: Normal rate and regular rhythm.      Heart sounds: Normal heart sounds. No murmur heard.  Pulmonary:      Effort: Pulmonary effort is normal.      Breath sounds: Normal " breath sounds. No wheezing or rhonchi.   Abdominal:      General: Abdomen is flat. Bowel sounds are normal.      Palpations: Abdomen is soft.      Tenderness: There is no abdominal tenderness.   Musculoskeletal:         General: Normal range of motion.      Cervical back: Normal range of motion and neck supple.   Lymphadenopathy:      Cervical: No cervical adenopathy.   Skin:     General: Skin is warm and dry.      Capillary Refill: Capillary refill takes less than 2 seconds.      Findings: No rash.   Neurological:      Mental Status: She is alert and oriented to person, place, and time.      Cranial Nerves: No cranial nerve deficit.      Sensory: No sensory deficit.   Psychiatric:         Behavior: Behavior normal.         Thought Content: Thought content normal.         Judgment: Judgment normal.

## 2025-01-21 NOTE — ASSESSMENT & PLAN NOTE
CPE done. Had COVID vaccines. Recommend flu shot and Tdap. Will check labs. Last Pap was in June 2023. Pt advised to follow a well balanced, heart healthy diet and to exercise on a regular basis. Quit smoking in 2016. Blood pressure good.   Orders:  •  Lipid panel; Future  •  Comprehensive metabolic panel; Future  •  CBC and differential; Future  •  UA (URINE) with reflex to Scope; Future  •  Hemoglobin A1C; Future

## 2025-01-21 NOTE — PATIENT INSTRUCTIONS
"Patient Education     Routine physical for adults   The Basics   Written by the doctors and editors at [facility]   What is a physical? -- A physical is a routine visit, or \"check-up,\" with your doctor. You might also hear it called a \"wellness visit\" or \"preventive visit.\"  During each visit, the doctor will:   Ask about your physical and mental health   Ask about your habits, behaviors, and lifestyle   Do an exam   Give you vaccines if needed   Talk to you about any medicines you take   Give advice about your health   Answer your questions  Getting regular check-ups is an important part of taking care of your health. It can help your doctor find and treat any problems you have. But it's also important for preventing health problems.  A routine physical is different from a \"sick visit.\" A sick visit is when you see a doctor because of a health concern or problem. Since physicals are scheduled ahead of time, you can think about what you want to ask the doctor.  How often should I get a physical? -- It depends on your age and health. In general, for people age 21 years and older:   If you are younger than 50 years, you might be able to get a physical every 3 years.   If you are 50 years or older, your doctor might recommend a physical every year.  If you have an ongoing health condition, like diabetes or high blood pressure, your doctor will probably want to see you more often.  What happens during a physical? -- In general, each visit will include:   Physical exam - The doctor or nurse will check your height, weight, heart rate, and blood pressure. They will also look at your eyes and ears. They will ask about how you are feeling and whether you have any symptoms that bother you.   Medicines - It's a good idea to bring a list of all the medicines you take to each doctor visit. Your doctor will talk to you about your medicines and answer any questions. Tell them if you are having any side effects that bother you. You " "should also tell them if you are having trouble paying for any of your medicines.   Habits and behaviors - This includes:   Your diet   Your exercise habits   Whether you smoke, drink alcohol, or use drugs   Whether you are sexually active   Whether you feel safe at home  Your doctor will talk to you about things you can do to improve your health and lower your risk of health problems. They will also offer help and support. For example, if you want to quit smoking, they can give you advice and might prescribe medicines. If you want to improve your diet or get more physical activity, they can help you with this, too.   Lab tests, if needed - The tests you get will depend on your age and situation. For example, your doctor might want to check your:   Cholesterol   Blood sugar   Iron level   Vaccines - The recommended vaccines will depend on your age, health, and what vaccines you already had. Vaccines are very important because they can prevent certain serious or deadly infections.   Discussion of screening - \"Screening\" means checking for diseases or other health problems before they cause symptoms. Your doctor can recommend screening based on your age, risk, and preferences. This might include tests to check for:   Cancer, such as breast, prostate, cervical, ovarian, colorectal, prostate, lung, or skin cancer   Sexually transmitted infections, such as chlamydia and gonorrhea   Mental health conditions like depression and anxiety  Your doctor will talk to you about the different types of screening tests. They can help you decide which screenings to have. They can also explain what the results might mean.   Answering questions - The physical is a good time to ask the doctor or nurse questions about your health. If needed, they can refer you to other doctors or specialists, too.  Adults older than 65 years often need other care, too. As you get older, your doctor will talk to you about:   How to prevent falling at " home   Hearing or vision tests   Memory testing   How to take your medicines safely   Making sure that you have the help and support you need at home  All topics are updated as new evidence becomes available and our peer review process is complete.  This topic retrieved from [employer] on: May 02, 2024.  Topic 384092 Version 1.0  Release: 32.4.3 - C32.122  © 2024 UpToDate, Inc. and/or its affiliates. All rights reserved.  Consumer Information Use and Disclaimer   Disclaimer: This generalized information is a limited summary of diagnosis, treatment, and/or medication information. It is not meant to be comprehensive and should be used as a tool to help the user understand and/or assess potential diagnostic and treatment options. It does NOT include all information about conditions, treatments, medications, side effects, or risks that may apply to a specific patient. It is not intended to be medical advice or a substitute for the medical advice, diagnosis, or treatment of a health care provider based on the health care provider's examination and assessment of a patient's specific and unique circumstances. Patients must speak with a health care provider for complete information about their health, medical questions, and treatment options, including any risks or benefits regarding use of medications. This information does not endorse any treatments or medications as safe, effective, or approved for treating a specific patient. UpToDate, Inc. and its affiliates disclaim any warranty or liability relating to this information or the use thereof.The use of this information is governed by the Terms of Use, available at https://www.[employer].com/en/know/clinical-effectiveness-terms. 2024© UpToDate, Inc. and its affiliates and/or licensors. All rights reserved.  Copyright   © 2024 UpToDate, Inc. and/or its affiliates. All rights reserved.

## 2025-01-24 LAB
ALBUMIN SERPL-MCNC: 4.1 G/DL (ref 3.5–5.7)
ALP SERPL-CCNC: 59 U/L (ref 35–120)
ALT SERPL-CCNC: 11 U/L
ANION GAP SERPL CALCULATED.3IONS-SCNC: 9 MMOL/L (ref 3–11)
AST SERPL-CCNC: 11 U/L
BASOPHILS # BLD AUTO: 0.1 THOU/CMM (ref 0–0.1)
BASOPHILS NFR BLD AUTO: 1 %
BILIRUB SERPL-MCNC: 0.6 MG/DL (ref 0.2–1)
BUN SERPL-MCNC: 11 MG/DL (ref 7–25)
CALCIUM SERPL-MCNC: 9.1 MG/DL (ref 8.5–10.5)
CHLORIDE SERPL-SCNC: 105 MMOL/L (ref 100–109)
CHOLEST SERPL-MCNC: 158 MG/DL
CHOLEST/HDLC SERPL: 3.2 {RATIO}
CO2 SERPL-SCNC: 27 MMOL/L (ref 21–31)
CREAT SERPL-MCNC: 0.87 MG/DL (ref 0.4–1.1)
CYTOLOGY CMNT CVX/VAG CYTO-IMP: ABNORMAL
DIFFERENTIAL METHOD BLD: NORMAL
EOSINOPHIL # BLD AUTO: 0.1 THOU/CMM (ref 0–0.5)
EOSINOPHIL NFR BLD AUTO: 2 %
ERYTHROCYTE [DISTWIDTH] IN BLOOD BY AUTOMATED COUNT: 13 % (ref 12–16)
EST. AVERAGE GLUCOSE BLD GHB EST-MCNC: 100 MG/DL
GFR/BSA.PRED SERPLBLD CYS-BASED-ARV: 88 ML/MIN/{1.73_M2}
GLUCOSE SERPL-MCNC: 80 MG/DL (ref 65–99)
HBA1C MFR BLD: 5.1 %
HCT VFR BLD AUTO: 40 % (ref 35–43)
HDLC SERPL-MCNC: 49 MG/DL (ref 23–92)
HGB BLD-MCNC: 13.2 G/DL (ref 11.5–14.5)
LDLC SERPL CALC-MCNC: 89 MG/DL
LYMPHOCYTES # BLD AUTO: 2.7 THOU/CMM (ref 1–3)
LYMPHOCYTES NFR BLD AUTO: 37 %
MCH RBC QN AUTO: 30.7 PG (ref 26–34)
MCHC RBC AUTO-ENTMCNC: 33 G/DL (ref 32–37)
MCV RBC AUTO: 93 FL (ref 80–100)
MONOCYTES # BLD AUTO: 0.4 THOU/CMM (ref 0.3–1)
MONOCYTES NFR BLD AUTO: 5 %
NEUTROPHILS # BLD AUTO: 4.1 THOU/CMM (ref 1.8–7.8)
NEUTROPHILS NFR BLD AUTO: 55 %
NONHDLC SERPL-MCNC: 109 MG/DL
PLATELET # BLD AUTO: 221 THOU/CMM (ref 140–350)
PMV BLD REES-ECKER: 9.3 FL (ref 7.5–11.3)
POTASSIUM SERPL-SCNC: 4.1 MMOL/L (ref 3.5–5.2)
PROT SERPL-MCNC: 6.2 G/DL (ref 6.3–8.3)
RBC # BLD AUTO: 4.31 MILL/CMM (ref 3.7–4.7)
SODIUM SERPL-SCNC: 141 MMOL/L (ref 135–145)
TRIGL SERPL-MCNC: 98 MG/DL
WBC # BLD AUTO: 7.4 THOU/CMM (ref 4–10)

## 2025-01-25 LAB
GLUCOSE UR QL STRIP: NEGATIVE MG/DL
HGB UR QL STRIP: NEGATIVE MG/DL
KETONES UR QL STRIP: NEGATIVE MG/DL
LEUKOCYTE ESTERASE UR QL STRIP: NEGATIVE /UL
NITRITE UR QL STRIP: NEGATIVE
PH UR: 5 [PH] (ref 4.5–8)
PROT 24H UR-MRATE: NEGATIVE MG/DL
SL AMB POCT URINE COMMENT: NORMAL
SP GR UR: 1.02 (ref 1–1.03)

## 2025-02-08 NOTE — PROGRESS NOTES
Name: Karina Saleh      : 1985      MRN: [id number]  Encounter Provider: Shelia Espinal PA-C  Encounter Date: 2/10/2025   Encounter department: [facility]  :  Assessment & Plan  Irregular menstrual cycle    Orders:  •  norethindrone-ethinyl estradiol-iron (Junel FE 1.5/30) 1.5-30 MG-MCG tablet; Take 1 tablet by mouth daily    She is with a newer partner and she does question fertility on pills long term. We reviewed AMA being a larger factor in fertility at this point as opposed to BC use.   She plans to continue on pills for now.   Will adjust dosing an call in 3 months with update on progress.        History of Present Illness   HPI  Karina Saleh is a 39 y.o. female who presents to review her menstrual cycles on OCPs. She is taking Junel Fe 1/20.   She notes that some months will get period in week 3. Will then last through placebo week.   This has been happening the last 7 months or so then this month she missed.   No missed pills.   No other meds used.   Feels well on this pill otherwise.           History obtained from: patient    Review of Systems  Medical History Reviewed by provider this encounter:     .  Past Medical History   Past Medical History:   Diagnosis Date   • Abnormal Pap smear of cervix 2016    Colpo - benign ECC   • Anxiety    • Tonsillitis      Past Surgical History:   Procedure Laterality Date   • CARPAL TUNNEL RELEASE      19 - right; 19 - left   • WISDOM TOOTH EXTRACTION       Family History   Problem Relation Age of Onset   • Asthma Mother    • Migraines Mother         Mom   • No Known Problems Father    • Other Maternal Grandmother         cardiac disorder   • Diabetes Maternal Grandmother         mellitus   • Hypertension Maternal Grandmother    • Stroke Maternal Grandfather       reports that she quit smoking about 8 years ago. Her smoking use included cigarettes. She started smoking about 23 years ago. She has a 15 pack-year smoking history.  "She has never used smokeless tobacco. She reports that she does not currently use alcohol after a past usage of about 2.0 standard drinks of alcohol per week. She reports that she does not use drugs.  Current Outpatient Medications on File Prior to Visit   Medication Sig Dispense Refill   • Junel 1/20 1-20 MG-MCG per tablet TAKE 1 TABLET BY MOUTH EVERY DAY 84 tablet 1   • Multiple Vitamins-Minerals (WOMENS MULTI VITAMIN & MINERAL PO) Take 1 tablet by mouth in the morning     • Probiotic Product (PROBIOTIC BLEND PO) Take 1 tablet by mouth in the morning       No current facility-administered medications on file prior to visit.     Allergies   Allergen Reactions   • Nystatin Rash      Current Outpatient Medications on File Prior to Visit   Medication Sig Dispense Refill   • Junel 1/20 1-20 MG-MCG per tablet TAKE 1 TABLET BY MOUTH EVERY DAY 84 tablet 1   • Multiple Vitamins-Minerals (WOMENS MULTI VITAMIN & MINERAL PO) Take 1 tablet by mouth in the morning     • Probiotic Product (PROBIOTIC BLEND PO) Take 1 tablet by mouth in the morning       No current facility-administered medications on file prior to visit.      Social History     Tobacco Use   • Smoking status: Former     Current packs/day: 0.00     Average packs/day: 1 pack/day for 15.0 years (15.0 ttl pk-yrs)     Types: Cigarettes     Start date: 2001     Quit date: 2016     Years since quittin.2   • Smokeless tobacco: Never   Vaping Use   • Vaping status: Never Used   Substance and Sexual Activity   • Alcohol use: Not Currently     Alcohol/week: 2.0 standard drinks of alcohol     Types: 2 Standard drinks or equivalent per week   • Drug use: No   • Sexual activity: Yes     Partners: Male     Birth control/protection: OCP     Comment: pt declines std testing        Objective   /76 (BP Location: Left arm, Patient Position: Sitting, Cuff Size: Standard)   Ht 5' 6\" (1.676 m)   Wt 74.4 kg (164 lb)   LMP 2025   BMI 26.47 kg/m²    "   Physical Exam  Vitals reviewed.   Constitutional:       Appearance: She is normal weight.   HENT:      Head: Normocephalic and atraumatic.   Cardiovascular:      Rate and Rhythm: Normal rate.   Pulmonary:      Effort: Pulmonary effort is normal.   Skin:     General: Skin is warm and dry.   Neurological:      Mental Status: She is alert.   Psychiatric:         Mood and Affect: Mood normal.         Behavior: Behavior normal.         Thought Content: Thought content normal.         Judgment: Judgment normal.

## 2025-02-08 NOTE — ASSESSMENT & PLAN NOTE
Orders:  •  norethindrone-ethinyl estradiol-iron (Junel FE 1.5/30) 1.5-30 MG-MCG tablet; Take 1 tablet by mouth daily    She is with a newer partner and she does question fertility on pills long term. We reviewed AMA being a larger factor in fertility at this point as opposed to BC use.   She plans to continue on pills for now.   Will adjust dosing an call in 3 months with update on progress.

## 2025-02-10 ENCOUNTER — OFFICE VISIT (OUTPATIENT)
Dept: OBGYN CLINIC | Facility: CLINIC | Age: 40
End: 2025-02-10
Payer: COMMERCIAL

## 2025-02-10 VITALS
BODY MASS INDEX: 26.36 KG/M2 | DIASTOLIC BLOOD PRESSURE: 76 MMHG | SYSTOLIC BLOOD PRESSURE: 112 MMHG | HEIGHT: 66 IN | WEIGHT: 164 LBS

## 2025-02-10 DIAGNOSIS — N92.6 IRREGULAR MENSTRUAL CYCLE: Primary | ICD-10-CM

## 2025-02-10 PROCEDURE — 99213 OFFICE O/P EST LOW 20 MIN: CPT | Performed by: PHYSICIAN ASSISTANT

## 2025-02-10 RX ORDER — NORETHINDRONE ACETATE AND ETHINYL ESTRADIOL 1.5-30(21)
1 KIT ORAL DAILY
Qty: 90 TABLET | Refills: 3 | Status: SHIPPED | OUTPATIENT
Start: 2025-02-10

## 2025-02-20 PROBLEM — Z00.00 ENCOUNTER FOR ANNUAL WELLNESS VISIT: Status: RESOLVED | Noted: 2025-01-21 | Resolved: 2025-02-20

## 2025-05-05 ENCOUNTER — TELEPHONE (OUTPATIENT)
Age: 40
End: 2025-05-05

## 2025-05-05 DIAGNOSIS — N92.6 IRREGULAR MENSTRUAL CYCLE: ICD-10-CM

## 2025-05-05 RX ORDER — NORETHINDRONE ACETATE AND ETHINYL ESTRADIOL 1.5-30(21)
1 KIT ORAL DAILY
Qty: 90 TABLET | Refills: 3 | Status: SHIPPED | OUTPATIENT
Start: 2025-05-05

## 2025-05-05 NOTE — TELEPHONE ENCOUNTER
Patient called to ask for her Junel prescription of Junel FE 1.5-30 MG-MCG tablets be sent to [facility] instead of [facility] as she picked up Junel from [facility] and it was old dose. She took one of past dose last night. She requests [employer] message when prescription is sent.

## 2025-08-18 ENCOUNTER — TELEPHONE (OUTPATIENT)
Age: 40
End: 2025-08-18

## 2025-08-20 PROBLEM — M54.50 LOW BACK PAIN: Status: ACTIVE | Noted: 2025-08-20

## 2025-08-20 PROBLEM — R73.03 PREDIABETES: Status: RESOLVED | Noted: 2025-01-21 | Resolved: 2025-08-20

## 2025-08-21 ENCOUNTER — OFFICE VISIT (OUTPATIENT)
Dept: FAMILY MEDICINE CLINIC | Facility: CLINIC | Age: 40
End: 2025-08-21
Payer: COMMERCIAL

## 2025-08-21 VITALS
RESPIRATION RATE: 16 BRPM | DIASTOLIC BLOOD PRESSURE: 78 MMHG | HEIGHT: 66 IN | HEART RATE: 83 BPM | BODY MASS INDEX: 30.05 KG/M2 | OXYGEN SATURATION: 98 % | WEIGHT: 187 LBS | SYSTOLIC BLOOD PRESSURE: 108 MMHG

## 2025-08-21 DIAGNOSIS — G89.29 CHRONIC MIDLINE LOW BACK PAIN WITHOUT SCIATICA: Primary | ICD-10-CM

## 2025-08-21 DIAGNOSIS — M54.50 CHRONIC MIDLINE LOW BACK PAIN WITHOUT SCIATICA: Primary | ICD-10-CM

## 2025-08-21 PROCEDURE — 99213 OFFICE O/P EST LOW 20 MIN: CPT | Performed by: FAMILY MEDICINE

## 2025-08-21 RX ORDER — NAPROXEN 500 MG/1
500 TABLET ORAL 2 TIMES DAILY WITH MEALS
Qty: 20 TABLET | Refills: 0 | Status: SHIPPED | OUTPATIENT
Start: 2025-08-21